# Patient Record
Sex: FEMALE | Race: WHITE | NOT HISPANIC OR LATINO | Employment: OTHER | URBAN - METROPOLITAN AREA
[De-identification: names, ages, dates, MRNs, and addresses within clinical notes are randomized per-mention and may not be internally consistent; named-entity substitution may affect disease eponyms.]

---

## 2017-02-28 ENCOUNTER — HOSPITAL ENCOUNTER (OUTPATIENT)
Dept: RADIOLOGY | Facility: MEDICAL CENTER | Age: 67
End: 2017-02-28

## 2017-04-21 ENCOUNTER — GYNECOLOGY VISIT (OUTPATIENT)
Dept: OBGYN | Facility: CLINIC | Age: 67
End: 2017-04-21
Payer: OTHER MISCELLANEOUS

## 2017-04-21 VITALS
SYSTOLIC BLOOD PRESSURE: 140 MMHG | BODY MASS INDEX: 35.31 KG/M2 | DIASTOLIC BLOOD PRESSURE: 82 MMHG | WEIGHT: 233 LBS | HEIGHT: 68 IN

## 2017-04-21 DIAGNOSIS — N81.9 VAGINAL VAULT PROLAPSE: ICD-10-CM

## 2017-04-21 DIAGNOSIS — Z12.31 VISIT FOR SCREENING MAMMOGRAM: ICD-10-CM

## 2017-04-21 PROCEDURE — 99203 OFFICE O/P NEW LOW 30 MIN: CPT | Performed by: OBSTETRICS & GYNECOLOGY

## 2017-04-21 PROCEDURE — 3014F SCREEN MAMMO DOC REV: CPT | Performed by: OBSTETRICS & GYNECOLOGY

## 2017-04-21 PROCEDURE — 4004F PT TOBACCO SCREEN RCVD TLK: CPT | Mod: 8P | Performed by: OBSTETRICS & GYNECOLOGY

## 2017-04-21 PROCEDURE — 3017F COLORECTAL CA SCREEN DOC REV: CPT | Mod: 8P | Performed by: OBSTETRICS & GYNECOLOGY

## 2017-04-21 PROCEDURE — 4040F PNEUMOC VAC/ADMIN/RCVD: CPT | Mod: 8P | Performed by: OBSTETRICS & GYNECOLOGY

## 2017-04-21 PROCEDURE — G8419 CALC BMI OUT NRM PARAM NOF/U: HCPCS | Performed by: OBSTETRICS & GYNECOLOGY

## 2017-04-21 PROCEDURE — G8432 DEP SCR NOT DOC, RNG: HCPCS | Performed by: OBSTETRICS & GYNECOLOGY

## 2017-04-21 PROCEDURE — 1101F PT FALLS ASSESS-DOCD LE1/YR: CPT | Mod: 8P | Performed by: OBSTETRICS & GYNECOLOGY

## 2017-04-21 NOTE — MR AVS SNAPSHOT
"Meghan Mcrae   2017 3:00 PM   Gynecology Visit   MRN: [id number]    Department:  [facility]   Dept Phone:  [phone]    Description:  Female : 1950   Provider:  Princess No M.D.           Reason for Visit     New Patient           Allergies as of 2017     Allergen Noted Reactions    Penicillins 09/10/2013   Itching      Vital Signs     Blood Pressure Height Weight Body Mass Index Breastfeeding? Smoking Status    140/82 mmHg 1.727 m (5' 8\") 105.688 kg (233 lb) 35.44 kg/m2 No Current Some Day Smoker      Basic Information     Date Of Birth Sex Race Ethnicity Preferred Language    1950 Female White Non- English      Problem List              ICD-10-CM Priority Class Noted - Resolved    Osteoarthritis of right shoulder region M19.011   2013 - Present      Health Maintenance        Date Due Completion Dates    IMM DTaP/Tdap/Td Vaccine (1 - Tdap) 10/21/1969 ---    PAP SMEAR 10/21/1971 ---    MAMMOGRAM 10/21/1990 ---    COLONOSCOPY 10/21/2000 ---    IMM ZOSTER VACCINE 10/21/2010 ---    BONE DENSITY 10/21/2015 ---    IMM PNEUMOCOCCAL 65+ (ADULT) LOW/MEDIUM RISK SERIES (1 of 2 - PCV13) 10/21/2015 ---            Current Immunizations     No immunizations on file.      Below and/or attached are the medications your provider expects you to take. Review all of your home medications and newly ordered medications with your provider and/or pharmacist. Follow medication instructions as directed by your provider and/or pharmacist. Please keep your medication list with you and share with your provider. Update the information when medications are discontinued, doses are changed, or new medications (including over-the-counter products) are added; and carry medication information at all times in the event of emergency situations     Allergies:  PENICILLINS - Itching               Medications  Valid as of: 2017 -  4:45 PM    Generic Name Brand Name Tablet Size " Instructions for use    Cholecalciferol (Cap) vitamin D3 5000 UNITS Take 1 Cap by mouth every day.        Gabapentin (Cap) NEURONTIN 300 MG Take 1 Cap by mouth 3 times a day.        Lisinopril (Tab) PRINIVIL 20 MG Take 20 mg by mouth every morning.        OxyCODONE HCl (TABLET SR 12 HR) OXYCONTIN 40 MG Take 60 mg by mouth 4 times a day.        OxyCODONE HCl (Tablet Extended Release 12 hour Abuse-Deterrent) OXYCONTIN 60 MG Take 60 mg by mouth 4 times a day as needed (PT sometimes breaks tablets in half to get smaller dose.).        Oxycodone-Acetaminophen (Tab) PERCOCET-10  MG Take 1-2 Tabs by mouth every four hours as needed.        Sodium Chloride (Tab) SALT 1 GM Take 1 g by mouth 3 times a day. Indications: Electrolyte Disturbance        Vitamin E (Cap) VITAMIN E 400 UNIT Take 400 Units by mouth every day.        .                 Medicines prescribed today were sent to:     [facility] [address] [place] [id number]    Phone: [phone] Fax: [phone]    Open 24 Hours?: No      Medication refill instructions:       If your prescription bottle indicates you have medication refills left, it is not necessary to call your provider’s office. Please contact your pharmacy and they will refill your medication.    If your prescription bottle indicates you do not have any refills left, you may request refills at any time through one of the following ways: The online [email or web address] system (except Urgent Care), by calling your provider’s office, or by asking your pharmacy to contact your provider’s office with a refill request. Medication refills are processed only during regular business hours and may not be available until the next business day. Your provider may request additional information or to have a follow-up visit with you prior to refilling your medication.   *Please Note: Medication refills are assigned a new Rx number when refilled  electronically. Your pharmacy may indicate that no refills were authorized even though a new prescription for the same medication is available at the pharmacy. Please request the medicine by name with the pharmacy before contacting your provider for a refill.           [employer] Access Code: [email or web address]  Expires: 5/21/2017  2:36 PM    [employer]  A secure, online tool to manage your health information     [employer]’s [employer]® is a secure, online tool that connects you to your personalized health information from the privacy of your home -- day or night - making it very easy for you to manage your healthcare. Once the activation process is completed, you can even access your medical information using the [employer] ellen, which is available for free in the Apple Ellen store or Google Play store.     [employer] provides the following levels of access (as shown below):   My Chart Features   [facility] Doctor [facility]  Specialists [facility]  Urgent  Care Non-[facility]  Primary Care  Doctor   Email your healthcare team securely and privately 24/7 X X X    Manage appointments: schedule your next appointment; view details of past/upcoming appointments X      Request prescription refills. X      View recent personal medical records, including lab and immunizations X X X X   View health record, including health history, allergies, medications X X X X   Read reports about your outpatient visits, procedures, consult and ER notes X X X X   See your discharge summary, which is a recap of your hospital and/or ER visit that includes your diagnosis, lab results, and care plan. X X       How to register for [employer]:  1. Go to  [email or web address].  2. Click on the Sign Up Now box, which takes you to the New Member Sign Up page. You will need to provide the following information:  a. Enter your [employer] Access Code exactly as it appears at the top of this page. (You will not need to use this code after you’ve completed the sign-up  process. If you do not sign up before the expiration date, you must request a new code.)   b. Enter your date of birth.   c. Enter your home email address.   d. Click Submit, and follow the next screen’s instructions.  3. Create a [employer] ID. This will be your [employer] login ID and cannot be changed, so think of one that is secure and easy to remember.  4. Create a [employer] password. You can change your password at any time.  5. Enter your Password Reset Question and Answer. This can be used at a later time if you forget your password.   6. Enter your e-mail address. This allows you to receive e-mail notifications when new information is available in [employer].  7. Click Sign Up. You can now view your health information.    For assistance activating your [employer] account, call [phone]        Quit Tobacco Information     Do you want to quit using tobacco?    Quitting tobacco decreases risks of cancer, heart and lung disease, increases life expectancy, improves sense of taste and smell, and increases spending money, among other benefits.    If you are thinking about quitting, we can help.  • [facility] Quit Tobacco Program: 101.788.1307  o Program occurs weekly for four weeks and includes pharmacist consultation on products to support quitting smoking or chewing tobacco. A provider referral is needed for pharmacist consultation.  • Tobacco Users Help Hotline: 6-362-QUIT-NOW (115-5235) or https://[place].quitlogix.org/  o Free, confidential telephone and online coaching for [place] residents. Sessions are designed on a schedule that is convenient for you. Eligible clients receive free nicotine replacement therapy.  • Nationally: www.smokefree.gov  o Information and professional assistance to support both immediate and long-term needs as you become, and remain, a non-smoker. Smokefree.gov allows you to choose the help that best fits your needs.

## 2017-04-21 NOTE — PROGRESS NOTES
"66-year-old female who is here today desiring hysterectomy. Patient states that she was given a pessary by a previous physician and she's already had her tubes removed and now desires a hysterectomy. Patient states that her tubes were removed in 2007 and also her ovaries. Patient is complaining of vaginal vault prolapse as well as leakage of urine. Patient states that she leaks urine if she coughs sneezes or strains she also leaks urine and has urge incontinence. She was given a pessary about a year and a half ago she states she uses this pessary without complication and she removes it as needed and cleans it every 2 weeks or so. Otherwise she is without complaints she is not experiencing any significant pelvic pain    Past medical history is reviewed and updated  Past surgical history is reviewed and updated  Medications reviewed and updated  Allergies reviewed and updated  Social history reviewed and updated  Family history reviewed and updated    /82 mmHg  Ht 1.727 m (5' 8\")  Wt 105.688 kg (233 lb)  BMI 35.44 kg/m2  Breastfeeding? 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 I would recommend that the patient complete those procedures first and then return here following to have further discussion of surgical intervention for her vault prolapse. In the meantime she can continue to use her pessary which is working well for her  "

## 2017-04-27 ENCOUNTER — APPOINTMENT (OUTPATIENT)
Dept: ADMISSIONS | Facility: MEDICAL CENTER | Age: 67
End: 2017-04-27
Attending: SURGERY
Payer: OTHER MISCELLANEOUS

## 2017-04-27 RX ORDER — MULTIVIT-MIN/IRON/FOLIC ACID/K 18-600-40
1 CAPSULE ORAL DAILY
COMMUNITY

## 2017-04-27 RX ORDER — OXYCODONE HCL 40 MG/1
40 TABLET, FILM COATED, EXTENDED RELEASE ORAL 3 TIMES DAILY
COMMUNITY

## 2017-04-27 RX ORDER — LISINOPRIL AND HYDROCHLOROTHIAZIDE 20; 12.5 MG/1; MG/1
1 TABLET ORAL DAILY
Status: ON HOLD | COMMUNITY
End: 2018-02-01

## 2017-05-02 ENCOUNTER — HOSPITAL ENCOUNTER (OUTPATIENT)
Facility: MEDICAL CENTER | Age: 67
End: 2017-05-02
Attending: SURGERY | Admitting: SURGERY
Payer: OTHER MISCELLANEOUS

## 2017-05-02 VITALS
HEART RATE: 70 BPM | WEIGHT: 231.26 LBS | DIASTOLIC BLOOD PRESSURE: 62 MMHG | SYSTOLIC BLOOD PRESSURE: 123 MMHG | HEIGHT: 68 IN | RESPIRATION RATE: 17 BRPM | OXYGEN SATURATION: 94 % | TEMPERATURE: 97 F | BODY MASS INDEX: 35.05 KG/M2

## 2017-05-02 PROBLEM — K40.90 LEFT INGUINAL HERNIA: Status: ACTIVE | Noted: 2017-05-02

## 2017-05-02 LAB
ANION GAP SERPL CALC-SCNC: 9 MMOL/L (ref 0–11.9)
BUN SERPL-MCNC: 19 MG/DL (ref 8–22)
CALCIUM SERPL-MCNC: 9.2 MG/DL (ref 8.5–10.5)
CHLORIDE SERPL-SCNC: 98 MMOL/L (ref 96–112)
CO2 SERPL-SCNC: 25 MMOL/L (ref 20–33)
CREAT SERPL-MCNC: 1.02 MG/DL (ref 0.5–1.4)
EKG IMPRESSION: NORMAL
GFR SERPL CREATININE-BSD FRML MDRD: 54 ML/MIN/1.73 M 2
GLUCOSE SERPL-MCNC: 113 MG/DL (ref 65–99)
POTASSIUM SERPL-SCNC: 4.3 MMOL/L (ref 3.6–5.5)
SODIUM SERPL-SCNC: 132 MMOL/L (ref 135–145)

## 2017-05-02 PROCEDURE — 160009 HCHG ANES TIME/MIN: Performed by: SURGERY

## 2017-05-02 PROCEDURE — 93010 ELECTROCARDIOGRAM REPORT: CPT | Performed by: INTERNAL MEDICINE

## 2017-05-02 PROCEDURE — C1781 MESH (IMPLANTABLE): HCPCS | Performed by: SURGERY

## 2017-05-02 PROCEDURE — 700101 HCHG RX REV CODE 250

## 2017-05-02 PROCEDURE — 160028 HCHG SURGERY MINUTES - 1ST 30 MINS LEVEL 3: Performed by: SURGERY

## 2017-05-02 PROCEDURE — 160048 HCHG OR STATISTICAL LEVEL 1-5: Performed by: SURGERY

## 2017-05-02 PROCEDURE — A6402 STERILE GAUZE <= 16 SQ IN: HCPCS | Performed by: SURGERY

## 2017-05-02 PROCEDURE — 501838 HCHG SUTURE GENERAL: Performed by: SURGERY

## 2017-05-02 PROCEDURE — 93005 ELECTROCARDIOGRAM TRACING: CPT | Performed by: SURGERY

## 2017-05-02 PROCEDURE — 700111 HCHG RX REV CODE 636 W/ 250 OVERRIDE (IP)

## 2017-05-02 PROCEDURE — 80048 BASIC METABOLIC PNL TOTAL CA: CPT

## 2017-05-02 PROCEDURE — 160036 HCHG PACU - EA ADDL 30 MINS PHASE I: Performed by: SURGERY

## 2017-05-02 PROCEDURE — A9270 NON-COVERED ITEM OR SERVICE: HCPCS

## 2017-05-02 PROCEDURE — 500122 HCHG BOVIE, BLADE: Performed by: SURGERY

## 2017-05-02 PROCEDURE — 700102 HCHG RX REV CODE 250 W/ 637 OVERRIDE(OP)

## 2017-05-02 PROCEDURE — 160002 HCHG RECOVERY MINUTES (STAT): Performed by: SURGERY

## 2017-05-02 PROCEDURE — 502240 HCHG MISC OR SUPPLY RC 0272: Performed by: SURGERY

## 2017-05-02 PROCEDURE — 500380 HCHG DRAIN, PENROSE 1/4X12: Performed by: SURGERY

## 2017-05-02 PROCEDURE — 160039 HCHG SURGERY MINUTES - EA ADDL 1 MIN LEVEL 3: Performed by: SURGERY

## 2017-05-02 PROCEDURE — 160035 HCHG PACU - 1ST 60 MINS PHASE I: Performed by: SURGERY

## 2017-05-02 DEVICE — MESH SOFT 4 X 6 (3/CA): Type: IMPLANTABLE DEVICE | Status: FUNCTIONAL

## 2017-05-02 RX ORDER — LIDOCAINE AND PRILOCAINE 25; 25 MG/G; MG/G
1 CREAM TOPICAL
Status: DISCONTINUED | OUTPATIENT
Start: 2017-05-02 | End: 2017-05-02 | Stop reason: HOSPADM

## 2017-05-02 RX ORDER — SODIUM CHLORIDE, SODIUM LACTATE, POTASSIUM CHLORIDE, CALCIUM CHLORIDE 600; 310; 30; 20 MG/100ML; MG/100ML; MG/100ML; MG/100ML
INJECTION, SOLUTION INTRAVENOUS CONTINUOUS
Status: DISCONTINUED | OUTPATIENT
Start: 2017-05-02 | End: 2017-05-02 | Stop reason: HOSPADM

## 2017-05-02 RX ORDER — LIDOCAINE HYDROCHLORIDE 10 MG/ML
0.5 INJECTION, SOLUTION INFILTRATION; PERINEURAL
Status: DISCONTINUED | OUTPATIENT
Start: 2017-05-02 | End: 2017-05-02 | Stop reason: HOSPADM

## 2017-05-02 RX ORDER — BUPIVACAINE HYDROCHLORIDE AND EPINEPHRINE 5; 5 MG/ML; UG/ML
INJECTION, SOLUTION EPIDURAL; INTRACAUDAL; PERINEURAL
Status: DISCONTINUED | OUTPATIENT
Start: 2017-05-02 | End: 2017-05-02 | Stop reason: HOSPADM

## 2017-05-02 RX ORDER — OXYCODONE HCL 5 MG/5 ML
SOLUTION, ORAL ORAL
Status: COMPLETED
Start: 2017-05-02 | End: 2017-05-02

## 2017-05-02 RX ADMIN — OXYCODONE HYDROCHLORIDE 10 MG: 5 SOLUTION ORAL at 12:20

## 2017-05-02 RX ADMIN — SODIUM CHLORIDE, SODIUM LACTATE, POTASSIUM CHLORIDE, CALCIUM CHLORIDE: 600; 310; 30; 20 INJECTION, SOLUTION INTRAVENOUS at 09:40

## 2017-05-02 RX ADMIN — ALBUTEROL SULFATE 2.5 MG: 2.5 SOLUTION RESPIRATORY (INHALATION) at 12:10

## 2017-05-02 ASSESSMENT — PAIN SCALES - GENERAL
PAINLEVEL_OUTOF10: 0

## 2017-05-02 NOTE — OP REPORT
DATE OF SERVICE:  05/02/2017    DATE OF OPERATION:  05/02/2017    PREOPERATIVE DIAGNOSIS:  Left inguinal hernia (reducible, non-recurrent).    POSTOPERATIVE DIAGNOSIS:  Left inguinal hernia (reducible, non-recurrent).    PROCEDURE PERFORMED:  Repair of left inguinal hernia with mesh.    SURGEON:  Ilene Goff MD    ANESTHESIOLOGIST:  Chris Sharma MD    ANESTHESIA TYPE:  General.    PREOPERATIVE MEDICATIONS:  Ancef.    ASA:  Class II.    INDICATIONS:  This is a 66-year-old woman who presents with an enlarging left   inguinal hernia.  We have discussed risks and benefits of surgery and she   feels ready to proceed.    FINDINGS:  Large direct hernia.    PROCEDURE SUMMARY:  The patient was anesthetized in supine position, then   prepped with Betadine and draped in sterile fashion.  Time-out was confirmed.    Local anesthetic was injected prior to all incisions.  The incision was   created in what was felt to be 1 cm below the internal ring, but her pannus   did distort her anatomy somewhat.  The superficial vessels were ligated with   Vicryl ties and the external oblique aponeurosis was identified actually more   superior than the initial incision.  This was therefore cleared of the   overlying subcutaneous tissue and then a slit was created following the   direction of the fibers to the external ring.  The flaps were created in the   avascular plane.  The hernia defect was easily identified and reduced with   division of the loose areolar attachments.  The round ligament remnant was   ligated with 2-0 Vicryl ties.  The transversalis fascia was then imbricated on   itself using Vicryl sutures to reduce the hernia sac and keep it out of the   surgical field.  I then took the Bard soft mesh, which measured 4x6 inches and   this was minimally trimmed given the patient's body habitus.  This was   secured with 2-0 Prolene sutures, 2 cm medial to the pubic tubercle in an   interrupted fashion medially and superiorly  in the fascia.  A 2-0 Prolene   suture was also used to secure this 1 cm medial to the pubic tubercle and then   in continuous fashion to the iliopubic tract.  We had excellent visualization   of her anatomy at this point.  Laterally, the mesh was also secured with   Prolene sutures.  We irrigated and ensured there was excellent hemostasis.    The external oblique aponeurosis was then closed over the mesh using a running   2-0 Vicryl suture.  Further anesthetic was injected.  3-0 Vicryls were then   used to close Lizzy's fascia and then the deep dermal layer and then 4-0   Monocryl was used to close the skin.  Sterile dressings were applied and I was   informed all counts are correct.       ____________________________________     MD ADÁN Silverio / JODY    DD:  05/02/2017 11:52:02  DT:  05/02/2017 15:12:42    D#:  [id number]  Job#:  [id number]    cc: ARVIND Ibrahim MD

## 2017-05-02 NOTE — IP AVS SNAPSHOT
5/2/2017    Meghan Mcrae  [address]  [facility] [address]    Dear Meghan:    [facility] wants to ensure your discharge home is safe and you or your loved ones have had all of your questions answered regarding your care after you leave the hospital.    Below is a list of resources and contact information should you have any questions regarding your hospital stay, follow-up instructions, or active medical symptoms.    Questions or Concerns Regarding… Contact   Medical Questions Related to Your Discharge  (7 days a week, 8am-5pm) Contact a Nurse Care Coordinator   [phone]   Medical Questions Not Related to Your Discharge  (24 hours a day / 7 days a week)  Contact the Nurse Health Line   [phone]    Medications or Discharge Instructions Refer to your discharge packet   or contact your [facility] Primary Care Provider   [phone]   Follow-up Appointment(s) Schedule your appointment via SeeSpace   or contact Scheduling [phone]   Billing Review your statement via SeeSpace  or contact Billing [phone]   Medical Records Review your records via SeeSpace   or contact Medical Records [phone]     You may receive a telephone call within two days of discharge. This call is to make certain you understand your discharge instructions and have the opportunity to have any questions answered. You can also easily access your medical information, test results and upcoming appointments via the SeeSpace free online health management tool. You can learn more and sign up at [employer]/SeeSpace. For assistance setting up your SeeSpace account, please call [phone].    Once again, we want to ensure your discharge home is safe and that you have a clear understanding of any next steps in your care. If you have any questions or concerns, please do not hesitate to contact us, we are here for you. Thank you for choosing [facility] for your healthcare needs.    Sincerely,    Your [facility] Healthcare Team

## 2017-05-02 NOTE — OR NURSING
1153 Received from OR, report received from Dr. Sharma.  Left femoral dressing CDI.      1208 Pt is wheezing Dr. Sharma updated and ordered a breathing treatment.      1210 Breathing treatment started per MD orders.     1220 Oral pain medication given see mar    1230 Pt  brought to bedside.      1300 Pt attempted to void.  IS teaching completed.      1315 Pt voided without difficulty.  No bleeding noted on dressing.      1320 Pt ambulated around until with no difficulty.  No sob noted and oxygen saturation was 96% on room air.      1345 Dc instructions completed with Pt and her .      1355 Pt dc ' d to car via ambulating, steady gait. No c/o of pain or n/v.  Pt has all belongings.

## 2017-05-02 NOTE — OR SURGEON
Immediate Post-Operative Note      PreOp Diagnosis: Left inguinal hernia    PostOp Diagnosis: same    Procedure(s):  INGUINAL HERNIA REPAIR-OPEN - Wound Class: Clean    Surgeon(s):  Ilene Goff M.D.    Anesthesiologist/Type of Anesthesia:  Anesthesiologist: Chris Sharma M.D./General    Surgical Staff:  Circulator: Lisandra Siddiqui R.N.  Relief Circulator: Alyssa Wallis R.N.  Relief Scrub: Myranda Hussein  Scrub Person: Carmel Green    Specimen: None    Estimated Blood Loss: Min    Findings: direct hernia    Complications: None    ASA 2  Dictated [id number]    5/2/2017 11:47 AM Ilene Goff

## 2017-05-02 NOTE — IP AVS SNAPSHOT
" Home Care Instructions                                                                                                                Name:Meghan Mcrae  Medical Record Number:[id number]  CSN: [id number]    YOB: 1950   Age: 66 y.o.  Sex: female  HT:1.727 m (5' 8\") WT: 104.9 kg (231 lb 4.2 oz)          Admit Date: 5/2/2017     Discharge Date:   Today's Date: 5/2/2017  Attending Doctor:  Ilene Goff M.D.                  Allergies:  Penicillins                Discharge Instructions         ACTIVITY: Rest and take it easy for the first 24 hours.  A responsible adult is recommended to remain with you during that time.  It is normal to feel sleepy.  We encourage you to not do anything that requires balance, judgment or coordination.    MILD FLU-LIKE SYMPTOMS ARE NORMAL. YOU MAY EXPERIENCE GENERALIZED MUSCLE ACHES, THROAT IRRITATION, HEADACHE AND/OR SOME NAUSEA.    FOR 24 HOURS DO NOT:  Drive, operate machinery or run household appliances.  Drink beer or alcoholic beverages.   Make important decisions or sign legal documents.    SPECIAL INSTRUCTIONS:  Ambulate regularly, no lifting over 5 pounds.       DIET: To avoid nausea, slowly advance diet as tolerated, avoiding spicy or greasy foods for the first day.  Add more substantial food to your diet according to your physician's instructions.  Babies can be fed formula or breast milk as soon as they are hungry.  INCREASE FLUIDS AND FIBER TO AVOID CONSTIPATION.    SURGICAL DRESSING/BATHING: May shower but no baths. May remove tegaderm and gauze, but leave steri strips.    FOLLOW-UP APPOINTMENT:  A follow-up appointment should be arranged with your doctor, call to schedule.    You should CALL YOUR PHYSICIAN if you develop:  Fever greater than 101 degrees F.  Pain not relieved by medication, or persistent nausea or vomiting.  Excessive bleeding (blood soaking through dressing) or unexpected drainage from the wound.  Extreme redness or swelling around the " incision site, drainage of pus or foul smelling drainage.  Inability to urinate or empty your bladder within 8 hours.  Problems with breathing or chest pain.    You should call 911 if you develop problems with breathing or chest pain.  If you are unable to contact your doctor or surgical center, you should go to the nearest emergency room or urgent care center.  Physician's telephone #: *Dr. Goff [phone]    If any questions arise, call your doctor.  If your doctor is not available, please feel free to call the Surgical Center at [phone].  The Center is open Monday through Friday from 7AM to 7PM.  You can also call the HEALTH HOTLINE open 24 hours/day, 7 days/week and speak to a nurse at [phone], or toll free at [phone].    A registered nurse may call you a few days after your surgery to see how you are doing after your procedure.    MEDICATIONS: Resume taking daily medication.  Take prescribed pain medication with food.  If no medication is prescribed, you may take non-aspirin pain medication if needed.  PAIN MEDICATION CAN BE VERY CONSTIPATING.  Take a stool softener or laxative such as senokot, pericolace, or milk of magnesia if needed.    Prescription given for Zofran .  Last pain medication given at 12:20, next dose due at 4:20 Pm.    If your physician has prescribed pain medication that includes Acetaminophen (Tylenol), do not take additional Acetaminophen (Tylenol) while taking the prescribed medication.    Depression / Suicide Risk    As you are discharged from this [facility] facility, it is important to learn how to keep safe from harming yourself.    Recognize the warning signs:  · Abrupt changes in personality, positive or negative- including increase in energy   · Giving away possessions  · Change in eating patterns- significant weight changes-  positive or negative  · Change in sleeping patterns- unable to sleep or sleeping all the time   · Unwillingness or inability to  communicate  · Depression  · Unusual sadness, discouragement and loneliness  · Talk of wanting to die  · Neglect of personal appearance   · Rebelliousness- reckless behavior  · Withdrawal from people/activities they love  · Confusion- inability to concentrate     If you or a loved one observes any of these behaviors or has concerns about self-harm, here's what you can do:  · Talk about it- your feelings and reasons for harming yourself  · Remove any means that you might use to hurt yourself (examples: pills, rope, extension cords, firearm)  · Get professional help from the community (Mental Health, Substance Abuse, psychological counseling)  · Do not be alone:Call your Safe Contact- someone whom you trust who will be there for you.  · Call your local CRISIS HOTLINE 296-4175 or 674-795-8146  · Call your local Children's Mobile Crisis Response Team [place] (139) 886-5454 or www.mPort  · Call the toll free National Suicide Prevention Hotlines   · National Suicide Prevention Lifeline 500-090-WBZZ (0128)  · National Hope Line Network 800-SUICIDE (344-4423)       Medication List      CONTINUE taking these medications        Instructions    Morning Afternoon Evening Bedtime    gabapentin 300 MG Caps   Commonly known as:  NEURONTIN        Take 1 Cap by mouth 3 times a day.   Dose:  300 mg                        lisinopril-hydrochlorothiazide 20-12.5 MG per tablet   Commonly known as:  PRINZIDE, ZESTORETIC        Take 1 Tab by mouth every day.   Dose:  1 Tab                        MULTIVITAMIN ADULT PO        Take  by mouth.                        oxyCODONE CR 40 MG T12a tablet   Commonly known as:  OXYCONTIN        Take 40 mg by mouth every 12 hours.   Dose:  40 mg                        oxycodone-acetaminophen  MG Tabs   Commonly known as:  PERCOCET-10        Take 1-2 Tabs by mouth every four hours as needed.   Dose:  1-2 Tab                        VITAMIN C PO        Take  by mouth.                         Vitamin D 2000 UNITS Caps        Take  by mouth every day.                          STOP taking these medications     vitamin e 400 UNIT Caps   Commonly known as:  VITAMIN E                       Medication Information     Above and/or attached are the medications your physician expects you to take upon discharge. Review all of your home medications and newly ordered medications with your doctor and/or pharmacist. Follow medication instructions as directed by your doctor and/or pharmacist. Please keep your medication list with you and share with your physician. Update the information when medications are discontinued, doses are changed, or new medications (including over-the-counter products) are added; and carry medication information at all times in the event of emergency situations.        Resources     Quit Smoking / Tobacco Use:    I understand the use of any tobacco products increases my chance of suffering from future heart disease or stroke and could cause other illnesses which may shorten my life. Quitting the use of tobacco products is the single most important thing I can do to improve my health. For further information on smoking / tobacco cessation call a Toll Free Quit Line at 1-167.346.4955 (*[employer] [place]) or 1-102.227.1483 (American Lung Association) or you can access the web based program at www.lungusa.org.    [place] Tobacco Users Help Line:  (400) 577-9433       Toll Free: 1-912.732.6783  Quit Tobacco Program [facility] [phone]    Crisis Hotline:    [place] Crisis Hotline:  5-970-UZXMOCY or 1-503.445.7428    [place] Crisis Hotline:    1-350.577.8711 or 728-082-1967    Discharge Survey:   Thank you for choosing [facility]. We hope we did everything we could to make your hospital stay a pleasant one. You may be receiving a survey and we would appreciate your time and participation in answering the questions. Your input is very valuable to us in our  efforts to improve our service to our patients and their families.            Signatures     My signature on this form indicates that:    1. I acknowledge receipt and understanding of these Home Care Instruction.  2. My questions regarding this information have been answered to my satisfaction.  3. I have formulated a plan with my discharge nurse to obtain my prescribed medications for home.    __________________________________      __________________________________                   Patient Signature                                 Guardian/Responsible Adult Signature      __________________________________                 __________       ________                       Nurse Signature                                               Date                 Time

## 2017-05-02 NOTE — DISCHARGE INSTRUCTIONS
ACTIVITY: Rest and take it easy for the first 24 hours.  A responsible adult is recommended to remain with you during that time.  It is normal to feel sleepy.  We encourage you to not do anything that requires balance, judgment or coordination.    MILD FLU-LIKE SYMPTOMS ARE NORMAL. YOU MAY EXPERIENCE GENERALIZED MUSCLE ACHES, THROAT IRRITATION, HEADACHE AND/OR SOME NAUSEA.    FOR 24 HOURS DO NOT:  Drive, operate machinery or run household appliances.  Drink beer or alcoholic beverages.   Make important decisions or sign legal documents.    SPECIAL INSTRUCTIONS:  Ambulate regularly, no lifting over 5 pounds.       DIET: To avoid nausea, slowly advance diet as tolerated, avoiding spicy or greasy foods for the first day.  Add more substantial food to your diet according to your physician's instructions.  Babies can be fed formula or breast milk as soon as they are hungry.  INCREASE FLUIDS AND FIBER TO AVOID CONSTIPATION.    SURGICAL DRESSING/BATHING: May shower but no baths. May remove tegaderm and gauze, but leave steri strips.    FOLLOW-UP APPOINTMENT:  A follow-up appointment should be arranged with your doctor, call to schedule.    You should CALL YOUR PHYSICIAN if you develop:  Fever greater than 101 degrees F.  Pain not relieved by medication, or persistent nausea or vomiting.  Excessive bleeding (blood soaking through dressing) or unexpected drainage from the wound.  Extreme redness or swelling around the incision site, drainage of pus or foul smelling drainage.  Inability to urinate or empty your bladder within 8 hours.  Problems with breathing or chest pain.    You should call 911 if you develop problems with breathing or chest pain.  If you are unable to contact your doctor or surgical center, you should go to the nearest emergency room or urgent care center.  Physician's telephone #: *Dr. Goff [phone]    If any questions arise, call your doctor.  If your doctor is not available, please feel free to call  the Surgical Center at [phone].  The Center is open Monday through Friday from 7AM to 7PM.  You can also call the HEALTH HOTLINE open 24 hours/day, 7 days/week and speak to a nurse at [phone], or toll free at [phone].    A registered nurse may call you a few days after your surgery to see how you are doing after your procedure.    MEDICATIONS: Resume taking daily medication.  Take prescribed pain medication with food.  If no medication is prescribed, you may take non-aspirin pain medication if needed.  PAIN MEDICATION CAN BE VERY CONSTIPATING.  Take a stool softener or laxative such as senokot, pericolace, or milk of magnesia if needed.    Prescription given for Zofran .  Last pain medication given at 12:20, next dose due at 4:20 Pm.    If your physician has prescribed pain medication that includes Acetaminophen (Tylenol), do not take additional Acetaminophen (Tylenol) while taking the prescribed medication.    Depression / Suicide Risk    As you are discharged from this [facility] facility, it is important to learn how to keep safe from harming yourself.    Recognize the warning signs:  · Abrupt changes in personality, positive or negative- including increase in energy   · Giving away possessions  · Change in eating patterns- significant weight changes-  positive or negative  · Change in sleeping patterns- unable to sleep or sleeping all the time   · Unwillingness or inability to communicate  · Depression  · Unusual sadness, discouragement and loneliness  · Talk of wanting to die  · Neglect of personal appearance   · Rebelliousness- reckless behavior  · Withdrawal from people/activities they love  · Confusion- inability to concentrate     If you or a loved one observes any of these behaviors or has concerns about self-harm, here's what you can do:  · Talk about it- your feelings and reasons for harming yourself  · Remove any means that you might use to hurt yourself (examples: pills, rope,  extension cords, firearm)  · Get professional help from the community (Mental Health, Substance Abuse, psychological counseling)  · Do not be alone:Call your Safe Contact- someone whom you trust who will be there for you.  · Call your local CRISIS HOTLINE 095-7855 or 365-006-7736  · Call your local Children's Mobile Crisis Response Team [place] (453) 256-2677 or www.[employer]  · Call the toll free National Suicide Prevention Hotlines   · National Suicide Prevention Lifeline 437-858-LZLE (8294)  · National Hope Line Network 800-SUICIDE (624-2853)

## 2017-07-19 ENCOUNTER — HOSPITAL ENCOUNTER (INPATIENT)
Facility: MEDICAL CENTER | Age: 67
LOS: 2 days | DRG: 920 | End: 2017-07-21
Attending: SURGERY | Admitting: SURGERY
Payer: COMMERCIAL

## 2017-07-19 PROCEDURE — 770001 HCHG ROOM/CARE - MED/SURG/GYN PRIV*

## 2017-07-19 PROCEDURE — 700111 HCHG RX REV CODE 636 W/ 250 OVERRIDE (IP): Performed by: SURGERY

## 2017-07-19 PROCEDURE — A9270 NON-COVERED ITEM OR SERVICE: HCPCS | Performed by: SURGERY

## 2017-07-19 PROCEDURE — 700101 HCHG RX REV CODE 250: Performed by: SURGERY

## 2017-07-19 PROCEDURE — 700101 HCHG RX REV CODE 250

## 2017-07-19 PROCEDURE — 700102 HCHG RX REV CODE 250 W/ 637 OVERRIDE(OP): Performed by: SURGERY

## 2017-07-19 RX ORDER — DIPHENHYDRAMINE HCL 25 MG
25-50 TABLET ORAL EVERY 6 HOURS PRN
Status: DISCONTINUED | OUTPATIENT
Start: 2017-07-19 | End: 2017-07-21 | Stop reason: HOSPADM

## 2017-07-19 RX ORDER — CLINDAMYCIN PHOSPHATE 150 MG/ML
INJECTION, SOLUTION INTRAVENOUS
Status: COMPLETED
Start: 2017-07-19 | End: 2017-07-19

## 2017-07-19 RX ORDER — DIPHENHYDRAMINE HCL 25 MG
25-50 TABLET ORAL NIGHTLY PRN
Status: DISCONTINUED | OUTPATIENT
Start: 2017-07-19 | End: 2017-07-21 | Stop reason: HOSPADM

## 2017-07-19 RX ORDER — DEXTROSE MONOHYDRATE, SODIUM CHLORIDE, AND POTASSIUM CHLORIDE 50; 1.49; 4.5 G/1000ML; G/1000ML; G/1000ML
INJECTION, SOLUTION INTRAVENOUS CONTINUOUS
Status: DISCONTINUED | OUTPATIENT
Start: 2017-07-20 | End: 2017-07-20

## 2017-07-19 RX ORDER — ASPIRIN 81 MG/1
81 TABLET, CHEWABLE ORAL DAILY
Status: ON HOLD | COMMUNITY
End: 2017-07-21

## 2017-07-19 RX ORDER — MORPHINE SULFATE 4 MG/ML
2-4 INJECTION, SOLUTION INTRAMUSCULAR; INTRAVENOUS EVERY 4 HOURS PRN
Status: DISCONTINUED | OUTPATIENT
Start: 2017-07-19 | End: 2017-07-21 | Stop reason: HOSPADM

## 2017-07-19 RX ORDER — ONDANSETRON 2 MG/ML
4 INJECTION INTRAMUSCULAR; INTRAVENOUS EVERY 6 HOURS PRN
Status: DISCONTINUED | OUTPATIENT
Start: 2017-07-19 | End: 2017-07-21 | Stop reason: HOSPADM

## 2017-07-19 RX ORDER — CLINDAMYCIN PHOSPHATE 150 MG/ML
900 INJECTION, SOLUTION INTRAVENOUS EVERY 8 HOURS
Status: DISCONTINUED | OUTPATIENT
Start: 2017-07-19 | End: 2017-07-19

## 2017-07-19 RX ORDER — OXYCODONE HCL 20 MG/1
40 TABLET, FILM COATED, EXTENDED RELEASE ORAL 3 TIMES DAILY
Status: DISCONTINUED | OUTPATIENT
Start: 2017-07-19 | End: 2017-07-21 | Stop reason: HOSPADM

## 2017-07-19 RX ADMIN — OXYCODONE HYDROCHLORIDE 40 MG: 20 TABLET, FILM COATED, EXTENDED RELEASE ORAL at 21:24

## 2017-07-19 RX ADMIN — ENOXAPARIN SODIUM 30 MG: 100 INJECTION SUBCUTANEOUS at 21:25

## 2017-07-19 RX ADMIN — POTASSIUM CHLORIDE, DEXTROSE MONOHYDRATE AND SODIUM CHLORIDE: 150; 5; 450 INJECTION, SOLUTION INTRAVENOUS at 22:26

## 2017-07-19 RX ADMIN — CLINDAMYCIN PHOSPHATE 900 MG: 150 INJECTION, SOLUTION INTRAVENOUS at 22:25

## 2017-07-19 ASSESSMENT — LIFESTYLE VARIABLES
EVER_SMOKED: YES
ALCOHOL_USE: NO

## 2017-07-19 ASSESSMENT — PATIENT HEALTH QUESTIONNAIRE - PHQ9
SUM OF ALL RESPONSES TO PHQ QUESTIONS 1-9: 0
1. LITTLE INTEREST OR PLEASURE IN DOING THINGS: NOT AT ALL
SUM OF ALL RESPONSES TO PHQ9 QUESTIONS 1 AND 2: 0
2. FEELING DOWN, DEPRESSED, IRRITABLE, OR HOPELESS: NOT AT ALL

## 2017-07-19 ASSESSMENT — PAIN SCALES - GENERAL: PAINLEVEL_OUTOF10: 5

## 2017-07-19 NOTE — PROGRESS NOTES
Direct admit from MD office. Accepted by Dr. Goff for lt groin cellulitis.  ADT signed & held, needs to be released upon pt arrival.  Written orders received, faxed to unit and scanned to media tab.  Pt coming by car.

## 2017-07-19 NOTE — IP AVS SNAPSHOT
Cloud Amenity Access Code: [email or web address]  Expires: 8/20/2017  1:34 PM    Cloud Amenity  A secure, online tool to manage your health information     [employer]’s Cloud Amenity® is a secure, online tool that connects you to your personalized health information from the privacy of your home -- day or night - making it very easy for you to manage your healthcare. Once the activation process is completed, you can even access your medical information using the Cloud Amenity ellen, which is available for free in the Apple Ellen store or Google Play store.     Cloud Amenity provides the following levels of access (as shown below):   My Chart Features   [facility] Primary Care Doctor [facility]  Specialists [facility]  Urgent  Care Non-[facility]  Primary Care  Doctor   Email your healthcare team securely and privately 24/7 X X X X   Manage appointments: schedule your next appointment; view details of past/upcoming appointments X      Request prescription refills. X      View recent personal medical records, including lab and immunizations X X X X   View health record, including health history, allergies, medications X X X X   Read reports about your outpatient visits, procedures, consult and ER notes X X X X   See your discharge summary, which is a recap of your hospital and/or ER visit that includes your diagnosis, lab results, and care plan. X X       How to register for Cloud Amenity:  1. Go to  [email or web address].  2. Click on the Sign Up Now box, which takes you to the New Member Sign Up page. You will need to provide the following information:  a. Enter your Cloud Amenity Access Code exactly as it appears at the top of this page. (You will not need to use this code after you’ve completed the sign-up process. If you do not sign up before the expiration date, you must request a new code.)   b. Enter your date of birth.   c. Enter your home email address.   d. Click Submit, and follow the next screen’s instructions.  3. Create a Cloud Amenity ID. This will be your Cloud Amenity  login ID and cannot be changed, so think of one that is secure and easy to remember.  4. Create a [employer] password. You can change your password at any time.  5. Enter your Password Reset Question and Answer. This can be used at a later time if you forget your password.   6. Enter your e-mail address. This allows you to receive e-mail notifications when new information is available in [employer].  7. Click Sign Up. You can now view your health information.    For assistance activating your [employer] account, call [phone]

## 2017-07-19 NOTE — IP AVS SNAPSHOT
7/21/2017    Meghan Mcrae  [address]  [place] [address]    Dear Meghan:    [facility] wants to ensure your discharge home is safe and you or your loved ones have had all of your questions answered regarding your care after you leave the hospital.    Below is a list of resources and contact information should you have any questions regarding your hospital stay, follow-up instructions, or active medical symptoms.    Questions or Concerns Regarding… Contact   Medical Questions Related to Your Discharge  (7 days a week, 8am-5pm) Contact a Nurse Care Coordinator   [phone]   Medical Questions Not Related to Your Discharge  (24 hours a day / 7 days a week)  Contact the Nurse Health Line   [phone]    Medications or Discharge Instructions Refer to your discharge packet   or contact your [facility] Primary Care Provider   [phone]   Follow-up Appointment(s) Schedule your appointment via NXT-ID   or contact Scheduling [phone]   Billing Review your statement via NXT-ID  or contact Billing [phone]   Medical Records Review your records via NXT-ID   or contact Medical Records [phone]     You may receive a telephone call within two days of discharge. This call is to make certain you understand your discharge instructions and have the opportunity to have any questions answered. You can also easily access your medical information, test results and upcoming appointments via the NXT-ID free online health management tool. You can learn more and sign up at [email or web address]. For assistance setting up your NXT-ID account, please call [phone].    Once again, we want to ensure your discharge home is safe and that you have a clear understanding of any next steps in your care. If you have any questions or concerns, please do not hesitate to contact us, we are here for you. Thank you for choosing [facility] for your healthcare needs.    Sincerely,    Your [facility] Healthcare Team

## 2017-07-19 NOTE — IP AVS SNAPSHOT
" Home Care Instructions                                                                                                                  Name:Meghan Mcrae  Medical Record Number:[id number]  CSN: [id number]    YOB: 1950   Age: 66 y.o.  Sex: female  HT:1.753 m (5' 9\") WT: 105 kg (231 lb 7.7 oz)          Admit Date: 7/19/2017     Discharge Date:   Today's Date: 7/21/2017  Attending Doctor:  Ilene Goff M.D.                  Allergies:  Penicillins            Discharge Instructions       Walk regularly.  Record JOBY drainage daily and bring written record to PO visit.  Call Dr. Goff's office today to make appt for Monday if possible.  Continue Clindamycin.  Call if recurrent erythema, fevers/chills, excessive bleeding, dizziness/lightheadedness, or other questions or concerns.  May shower with drain site covered in waterproof dressing.      Discharge Instructions    Discharged to home by car with relative. Discharged via wheelchair, hospital escort: Yes.  Special equipment needed: JOBY drain    Be sure to schedule a follow-up appointment with your primary care doctor or any specialists as instructed.     Discharge Plan:   Diet Plan: Discussed  Activity Level: Discussed  Smoking Cessation Offered: Patient Refused  Confirmed Follow up Appointment: Patient to Call and Schedule Appointment  Confirmed Symptoms Management: Discussed  Medication Reconciliation Updated: Yes  Influenza Vaccine Indication: Patient Refuses    I understand that a diet low in cholesterol, fat, and sodium is recommended for good health. Unless I have been given specific instructions below for another diet, I accept this instruction as my diet prescription.   Other diet: as tolerated    Special Instructions: None    · Is patient discharged on Warfarin / Coumadin?   No     · Is patient Post Blood Transfusion?  No    Incision and Drainage, Care After  Refer to this sheet in the next few weeks. These instructions provide you with " information on caring for yourself after your procedure. Your caregiver may also give you more specific instructions. Your treatment has been planned according to current medical practices, but problems sometimes occur. Call your caregiver if you have any problems or questions after your procedure.  HOME CARE INSTRUCTIONS   · If antibiotic medicine is given, take it as directed. Finish it even if you start to feel better.  · Only take over-the-counter or prescription medicines for pain, discomfort, or fever as directed by your caregiver.  · Keep all follow-up appointments as directed by your caregiver.  · Change any bandages (dressings) as directed by your caregiver. Replace old dressings with clean dressings.  · Wash your hands before and after caring for your wound.  You will receive specific instructions for cleansing and caring for your wound.   SEEK MEDICAL CARE IF:   · You have increased pain, swelling, or redness around the wound.  · You have increased drainage, smell, or bleeding from the wound.  · You have muscle aches, chills, or you feel generally sick.  · You have a fever.  MAKE SURE YOU:   · Understand these instructions.  · Will watch your condition.  · Will get help right away if you are not doing well or get worse.     This information is not intended to replace advice given to you by your health care provider. Make sure you discuss any questions you have with your health care provider.     Document Released: 03/11/2013 Document Revised: 01/08/2016 Document Reviewed: 03/11/2013  [employer] Interactive Patient Education ©2016 [employer]      Bulb Drain Home Care  A bulb drain consists of a thin rubber tube and a soft, round bulb that creates a gentle suction. The rubber tube is placed in the area where you had surgery. A bulb is attached to the end of the tube that is outside the body. The bulb drain removes excess fluid that normally builds up in a surgical wound after surgery. The color and amount  of fluid will vary. Immediately after surgery, the fluid is bright red and is a little thicker than water. It may gradually change to a yellow or pink color and become more thin and water-like. When the amount decreases to about 1 or 2 tbsp in 24 hours, your health care provider will usually remove it.  DAILY CARE  · Keep the bulb flat (compressed) at all times, except while emptying it. The flatness creates suction. You can flatten the bulb by squeezing it firmly in the middle and then closing the cap.  · Keep sites where the tube enters the skin dry and covered with a bandage (dressing).  · Secure the tube 1-2 in (2.5-5.1 cm) below the insertion sites to keep it from pulling on your stitches. The tube is stitched in place and will not slip out.  · Secure the bulb as directed by your health care provider.  · For the first 3 days after surgery, there usually is more fluid in the bulb. Empty the bulb whenever it becomes half full because the bulb does not create enough suction if it is too full. The bulb could also overflow. Write down how much fluid you remove each time you empty your drain. Add up the amount removed in 24 hours.  · Empty the bulb at the same time every day once the amount of fluid decreases and you only need to empty it once a day. Write down the amounts and the 24-hour totals to give to your health care provider. This helps your health care provider know when the tubes can be removed.  EMPTYING THE BULB DRAIN  Before emptying the bulb, get a measuring cup, a piece of paper and a pen, and wash your hands.  · Gently run your fingers down the tube (stripping) to empty any drainage from the tubing into the bulb. This may need to be done several times a day to clear the tubing of clots and tissue.  · Open the bulb cap to release suction, which causes it to inflate. Do not touch the inside of the cap.  · Gently run your fingers down the tube (stripping) to empty any drainage from the tubing into the  bulb.  · Hold the cap out of the way, and pour fluid into the measuring cup.    · Squeeze the bulb to provide suction.   · Replace the cap.    · Check the tape that holds the tube to your skin. If it is becoming loose, you can remove the loose piece of tape and apply a new one. Then, pin the bulb to your shirt.    · Write down the amount of fluid you emptied out. Write down the date and each time you emptied your bulb drain. (If there are 2 bulbs, note the amount of drainage from each bulb and keep the totals separate. Your health care provider will want to know the total amounts for each drain and which tube is draining more.)    · Flush the fluid down the toilet and wash your hands.    · Call your health care provider once you have less than 2 tbsp of fluid collecting in the bulb drain every 24 hours.  If there is drainage around the tube site, change dressings and keep the area dry. Cleanse around tube with sterile saline and place dry gauze around site. This gauze should be changed when it is soiled. If it stays clean and unsoiled, it should still be changed daily.   SEEK MEDICAL CARE IF:  · Your drainage has a bad smell or is cloudy.    · You have a fever.    · Your drainage is increasing instead of decreasing.    · Your tube fell out.    · You have redness or swelling around the tube site.    · You have drainage from a surgical wound.    · Your bulb drain will not stay flat after you empty it.    MAKE SURE YOU:   · Understand these instructions.  · Will watch your condition.  · Will get help right away if you are not doing well or get worse.     This information is not intended to replace advice given to you by your health care provider. Make sure you discuss any questions you have with your health care provider.     Document Released: 12/15/2001 Document Revised: 01/08/2016 Document Reviewed: 05/22/2013  [employer] Interactive Patient Education ©2016 [employer]      Depression / Suicide Risk    As you are  discharged from this [facility] facility, it is important to learn how to keep safe from harming yourself.    Recognize the warning signs:  · Abrupt changes in personality, positive or negative- including increase in energy   · Giving away possessions  · Change in eating patterns- significant weight changes-  positive or negative  · Change in sleeping patterns- unable to sleep or sleeping all the time   · Unwillingness or inability to communicate  · Depression  · Unusual sadness, discouragement and loneliness  · Talk of wanting to die  · Neglect of personal appearance   · Rebelliousness- reckless behavior  · Withdrawal from people/activities they love  · Confusion- inability to concentrate     If you or a loved one observes any of these behaviors or has concerns about self-harm, here's what you can do:  · Talk about it- your feelings and reasons for harming yourself  · Remove any means that you might use to hurt yourself (examples: pills, rope, extension cords, firearm)  · Get professional help from the community (Mental Health, Substance Abuse, psychological counseling)  · Do not be alone:Call your Safe Contact- someone whom you trust who will be there for you.  · Call your local CRISIS HOTLINE 435-4951 or 911-037-6296  · Call your local Children's Mobile Crisis Response Team [place] (574) 159-5506 or www.Oceen  · Call the toll free National Suicide Prevention Hotlines   · National Suicide Prevention Lifeline 278-028-LGSN (0286)  · National Hope Line Network 800-SUICIDE (468-1034)        Follow-up Information     1. Follow up with Ilene Goff M.D..    Specialties:  Surgery, Radiology    Contact information    [address]  [address]  [facility] [address]  [phone]           Discharge Medication Instructions:    Below are the medications your physician expects you to take upon discharge:    Review all your home medications and newly ordered medications with your doctor and/or pharmacist. Follow  medication instructions as directed by your doctor and/or pharmacist.    Please keep your medication list with you and share with your physician.               Medication List      START taking these medications        Instructions    Morning Afternoon Evening Bedtime    clindamycin 300 MG Caps   Commonly known as:  CLEOCIN        Take 1 Cap by mouth 4 times a day.   Dose:  300 mg                          CONTINUE taking these medications        Instructions    Morning Afternoon Evening Bedtime    gabapentin 300 MG Caps   Commonly known as:  NEURONTIN        Take 1 Cap by mouth 3 times a day.   Dose:  300 mg                        lisinopril-hydrochlorothiazide 20-12.5 MG per tablet   Commonly known as:  PRINZIDE, ZESTORETIC        Take 1 Tab by mouth every day.   Dose:  1 Tab                        MULTIVITAMIN ADULT PO        Take  by mouth.                        oxyCODONE CR 40 MG T12a tablet   Last time this was given:  40 mg on 7/21/2017  8:56 AM   Commonly known as:  OXYCONTIN        Take 40 mg by mouth 3 times a day. Indications: Chronic Pain   Dose:  40 mg                        oxycodone-acetaminophen  MG Tabs   Commonly known as:  PERCOCET-10        Take 1-2 Tabs by mouth every four hours as needed.   Dose:  1-2 Tab                        VITAMIN C PO        Take  by mouth.                        Vitamin D 2000 UNITS Caps        Take  by mouth every day.                          STOP taking these medications     aspirin 81 MG Chew chewable tablet   Commonly known as:  ASA                    Where to Get Your Medications      These medications were sent to [facility] - [address]  [address] [place] [address] A [address] [place] [address]     Phone:  [phone]    - clindamycin 300 MG Caps            Instructions           Diet / Nutrition:    Follow any diet instructions given to you by your doctor or the dietician, including how much salt (sodium) you are allowed each  day.    If you are overweight, talk to your doctor about a weight reduction plan.    Activity:    Remain physically active following your doctor's instructions about exercise and activity.    Rest often.     Any time you become even a little tired or short of breath, SIT DOWN and rest.    Worsening Symptoms:    Report any of the following signs and symptoms to the doctor's office immediately:    *Pain of jaw, arm, or neck  *Chest pain not relieved by medication                               *Dizziness or loss of consciousness  *Difficulty breathing even when at rest   *More tired than usual                                       *Bleeding drainage or swelling of surgical site  *Swelling of feet, ankles, legs or stomach                 *Fever (>100ºF)  *Pink or blood tinged sputum  *Weight gain (3lbs/day or 5lbs /week)           *Shock from internal defibrillator (if applicable)  *Palpitations or irregular heartbeats                *Cool and/or numb extremities    Stroke Awareness    Common Risk Factors for Stroke include:    Age  Atrial Fibrillation  Carotid Artery Stenosis  Diabetes Mellitus  Excessive alcohol consumption  High blood pressure  Overweight   Physical inactivity  Smoking    Warning signs and symptoms of a stroke include:    *Sudden numbness or weakness of the face, arm or leg (especially on one side of the body).  *Sudden confusion, trouble speaking or understanding.  *Sudden trouble seeing in one or both eyes.  *Sudden trouble walking, dizziness, loss of balance or coordination.Sudden severe headache with no known cause.    It is very important to get treatment quickly when a stroke occurs. If you experience any of the above warning signs, call 911 immediately.                   Disclaimer         Quit Smoking / Tobacco Use:    I understand the use of any tobacco products increases my chance of suffering from future heart disease or stroke and could cause other illnesses which may shorten my life.  Quitting the use of tobacco products is the single most important thing I can do to improve my health. For further information on smoking / tobacco cessation call a Toll Free Quit Line at 1-797.807.4236 (*National Cancer [place]) or 1-763.796.3118 (American Lung Association) or you can access the web based program at www.lungusa.org.    [place] Tobacco Users Help Line:  (792) 955-1641       Toll Free: 1-226.727.4744  Quit Tobacco Program [facility] (035)093-5265    Crisis Hotline:    [place] Crisis Hotline:  1-548-HQXGUVV or 1-755.320.7742    [place] Crisis Hotline:    1-792.673.2347 or 279-445-1480    Discharge Survey:   Thank you for choosing [facility]. We hope we did everything we could to make your hospital stay a pleasant one. You may be receiving a phone survey and we would appreciate your time and participation in answering the questions. Your input is very valuable to us in our efforts to improve our service to our patients and their families.        My signature on this form indicates that:    1. I have reviewed and understand the above information.  2. My questions regarding this information have been answered to my satisfaction.  3. I have formulated a plan with my discharge nurse to obtain my prescribed medications for home.                  Disclaimer         __________________________________                     __________       ________                       Patient Signature                                                 Date                    Time

## 2017-07-19 NOTE — IP AVS SNAPSHOT
" <p align=\"LEFT\"><IMG SRC=\"[email or web address]\" alt=\"Image\" WIDTH=\"50%\" HEIGHT=\"200\" BORDER=\"\"></p>                   Name:Meghan Mcrae  Medical Record Number:[id number]  CSN: [id number]    YOB: 1950   Age: 66 y.o.  Sex: female  HT:1.753 m (5' 9\") WT: 105 kg (231 lb 7.7 oz)          Admit Date: 7/19/2017     Discharge Date:   Today's Date: 7/21/2017  Attending Doctor:  Ilene Goff M.D.                  Allergies:  Penicillins          Follow-up Information     1. Follow up with Ilene Goff M.D..    Specialties:  Surgery, Radiology    Contact information    [address] [address]  [phone]           Medication List      Take these Medications        Instructions    clindamycin 300 MG Caps   Commonly known as:  CLEOCIN    Take 1 Cap by mouth 4 times a day.   Dose:  300 mg       gabapentin 300 MG Caps   Commonly known as:  NEURONTIN    Take 1 Cap by mouth 3 times a day.   Dose:  300 mg       lisinopril-hydrochlorothiazide 20-12.5 MG per tablet   Commonly known as:  PRINZIDE, ZESTORETIC    Take 1 Tab by mouth every day.   Dose:  1 Tab       MULTIVITAMIN ADULT PO    Take  by mouth.       oxyCODONE CR 40 MG T12a tablet   Commonly known as:  OXYCONTIN    Take 40 mg by mouth 3 times a day. Indications: Chronic Pain   Dose:  40 mg       oxycodone-acetaminophen  MG Tabs   Commonly known as:  PERCOCET-10    Take 1-2 Tabs by mouth every four hours as needed.   Dose:  1-2 Tab       VITAMIN C PO    Take  by mouth.       Vitamin D 2000 UNITS Caps    Take  by mouth every day.         "

## 2017-07-20 LAB
ANION GAP SERPL CALC-SCNC: 5 MMOL/L (ref 0–11.9)
BASOPHILS # BLD AUTO: 0.8 % (ref 0–1.8)
BASOPHILS # BLD: 0.04 K/UL (ref 0–0.12)
BUN SERPL-MCNC: 11 MG/DL (ref 8–22)
CALCIUM SERPL-MCNC: 8.8 MG/DL (ref 8.4–10.2)
CHLORIDE SERPL-SCNC: 99 MMOL/L (ref 96–112)
CO2 SERPL-SCNC: 27 MMOL/L (ref 20–33)
CREAT SERPL-MCNC: 0.89 MG/DL (ref 0.5–1.4)
EOSINOPHIL # BLD AUTO: 0.18 K/UL (ref 0–0.51)
EOSINOPHIL NFR BLD: 3.8 % (ref 0–6.9)
ERYTHROCYTE [DISTWIDTH] IN BLOOD BY AUTOMATED COUNT: 51.1 FL (ref 35.9–50)
GFR SERPL CREATININE-BSD FRML MDRD: >60 ML/MIN/1.73 M 2
GLUCOSE SERPL-MCNC: 92 MG/DL (ref 65–99)
HCT VFR BLD AUTO: 34.6 % (ref 37–47)
HGB BLD-MCNC: 11 G/DL (ref 12–16)
IMM GRANULOCYTES # BLD AUTO: 0.01 K/UL (ref 0–0.11)
IMM GRANULOCYTES NFR BLD AUTO: 0.2 % (ref 0–0.9)
LYMPHOCYTES # BLD AUTO: 1.1 K/UL (ref 1–4.8)
LYMPHOCYTES NFR BLD: 23.3 % (ref 22–41)
MCH RBC QN AUTO: 29.3 PG (ref 27–33)
MCHC RBC AUTO-ENTMCNC: 31.8 G/DL (ref 33.6–35)
MCV RBC AUTO: 92 FL (ref 81.4–97.8)
MONOCYTES # BLD AUTO: 0.56 K/UL (ref 0–0.85)
MONOCYTES NFR BLD AUTO: 11.8 % (ref 0–13.4)
NEUTROPHILS # BLD AUTO: 2.84 K/UL (ref 2–7.15)
NEUTROPHILS NFR BLD: 60.1 % (ref 44–72)
NRBC # BLD AUTO: 0 K/UL
NRBC BLD AUTO-RTO: 0 /100 WBC
PLATELET # BLD AUTO: 168 K/UL (ref 164–446)
PMV BLD AUTO: 10.1 FL (ref 9–12.9)
POTASSIUM SERPL-SCNC: 4.3 MMOL/L (ref 3.6–5.5)
RBC # BLD AUTO: 3.76 M/UL (ref 4.2–5.4)
SODIUM SERPL-SCNC: 131 MMOL/L (ref 135–145)
WBC # BLD AUTO: 4.7 K/UL (ref 4.8–10.8)

## 2017-07-20 PROCEDURE — 36415 COLL VENOUS BLD VENIPUNCTURE: CPT

## 2017-07-20 PROCEDURE — 501838 HCHG SUTURE GENERAL: Performed by: SURGERY

## 2017-07-20 PROCEDURE — 500389 HCHG DRAIN, RESERVOIR SUCT JP 100CC: Performed by: SURGERY

## 2017-07-20 PROCEDURE — 160035 HCHG PACU - 1ST 60 MINS PHASE I: Performed by: SURGERY

## 2017-07-20 PROCEDURE — 0J9M00Z DRAINAGE OF LEFT UPPER LEG SUBCUTANEOUS TISSUE AND FASCIA WITH DRAINAGE DEVICE, OPEN APPROACH: ICD-10-PCS | Performed by: SURGERY

## 2017-07-20 PROCEDURE — 501487 HCHG STRYKER TIP: Performed by: SURGERY

## 2017-07-20 PROCEDURE — 501486 HCHG STRYKER IRRIG SET HC W/TUBING: Performed by: SURGERY

## 2017-07-20 PROCEDURE — 80048 BASIC METABOLIC PNL TOTAL CA: CPT

## 2017-07-20 PROCEDURE — 160039 HCHG SURGERY MINUTES - EA ADDL 1 MIN LEVEL 3: Performed by: SURGERY

## 2017-07-20 PROCEDURE — 160028 HCHG SURGERY MINUTES - 1ST 30 MINS LEVEL 3: Performed by: SURGERY

## 2017-07-20 PROCEDURE — 700111 HCHG RX REV CODE 636 W/ 250 OVERRIDE (IP)

## 2017-07-20 PROCEDURE — 700101 HCHG RX REV CODE 250

## 2017-07-20 PROCEDURE — 87070 CULTURE OTHR SPECIMN AEROBIC: CPT

## 2017-07-20 PROCEDURE — A6402 STERILE GAUZE <= 16 SQ IN: HCPCS | Performed by: SURGERY

## 2017-07-20 PROCEDURE — 500368 HCHG DRAIN, 7MM FLAT-FLUTED: Performed by: SURGERY

## 2017-07-20 PROCEDURE — 770006 HCHG ROOM/CARE - MED/SURG/GYN SEMI*

## 2017-07-20 PROCEDURE — 85025 COMPLETE CBC W/AUTO DIFF WBC: CPT

## 2017-07-20 PROCEDURE — 87205 SMEAR GRAM STAIN: CPT

## 2017-07-20 PROCEDURE — 700101 HCHG RX REV CODE 250: Performed by: SURGERY

## 2017-07-20 PROCEDURE — 700102 HCHG RX REV CODE 250 W/ 637 OVERRIDE(OP)

## 2017-07-20 PROCEDURE — A9270 NON-COVERED ITEM OR SERVICE: HCPCS

## 2017-07-20 PROCEDURE — A9270 NON-COVERED ITEM OR SERVICE: HCPCS | Performed by: SURGERY

## 2017-07-20 PROCEDURE — 160002 HCHG RECOVERY MINUTES (STAT): Performed by: SURGERY

## 2017-07-20 PROCEDURE — 700102 HCHG RX REV CODE 250 W/ 637 OVERRIDE(OP): Performed by: SURGERY

## 2017-07-20 PROCEDURE — 87075 CULTR BACTERIA EXCEPT BLOOD: CPT

## 2017-07-20 PROCEDURE — 500122 HCHG BOVIE, BLADE: Performed by: SURGERY

## 2017-07-20 PROCEDURE — 160048 HCHG OR STATISTICAL LEVEL 1-5: Performed by: SURGERY

## 2017-07-20 PROCEDURE — 160009 HCHG ANES TIME/MIN: Performed by: SURGERY

## 2017-07-20 PROCEDURE — 700111 HCHG RX REV CODE 636 W/ 250 OVERRIDE (IP): Performed by: SURGERY

## 2017-07-20 PROCEDURE — 700105 HCHG RX REV CODE 258: Performed by: SURGERY

## 2017-07-20 RX ORDER — BUPIVACAINE HYDROCHLORIDE AND EPINEPHRINE 5; 5 MG/ML; UG/ML
INJECTION, SOLUTION EPIDURAL; INTRACAUDAL; PERINEURAL
Status: DISCONTINUED | OUTPATIENT
Start: 2017-07-20 | End: 2017-07-20 | Stop reason: HOSPADM

## 2017-07-20 RX ORDER — DEXTROSE MONOHYDRATE, SODIUM CHLORIDE, AND POTASSIUM CHLORIDE 50; 1.49; 9 G/1000ML; G/1000ML; G/1000ML
INJECTION, SOLUTION INTRAVENOUS CONTINUOUS
Status: DISCONTINUED | OUTPATIENT
Start: 2017-07-20 | End: 2017-07-21 | Stop reason: HOSPADM

## 2017-07-20 RX ORDER — ONDANSETRON 2 MG/ML
INJECTION INTRAMUSCULAR; INTRAVENOUS
Status: DISPENSED
Start: 2017-07-20 | End: 2017-07-21

## 2017-07-20 RX ORDER — OXYCODONE HCL 5 MG/5 ML
SOLUTION, ORAL ORAL
Status: COMPLETED
Start: 2017-07-20 | End: 2017-07-20

## 2017-07-20 RX ADMIN — MORPHINE SULFATE 4 MG: 4 INJECTION INTRAVENOUS at 09:57

## 2017-07-20 RX ADMIN — ENOXAPARIN SODIUM 30 MG: 100 INJECTION SUBCUTANEOUS at 22:28

## 2017-07-20 RX ADMIN — POTASSIUM CHLORIDE, DEXTROSE MONOHYDRATE AND SODIUM CHLORIDE: 150; 5; 900 INJECTION, SOLUTION INTRAVENOUS at 12:10

## 2017-07-20 RX ADMIN — OXYCODONE HYDROCHLORIDE 40 MG: 20 TABLET, FILM COATED, EXTENDED RELEASE ORAL at 17:36

## 2017-07-20 RX ADMIN — OXYCODONE HYDROCHLORIDE 5 MG: 5 SOLUTION ORAL at 17:19

## 2017-07-20 RX ADMIN — MORPHINE SULFATE 2 MG: 4 INJECTION INTRAVENOUS at 04:35

## 2017-07-20 RX ADMIN — ENOXAPARIN SODIUM 30 MG: 100 INJECTION SUBCUTANEOUS at 09:57

## 2017-07-20 RX ADMIN — CLINDAMYCIN PHOSPHATE 900 MG: 150 INJECTION, SOLUTION INTRAVENOUS at 14:16

## 2017-07-20 RX ADMIN — CLINDAMYCIN PHOSPHATE 900 MG: 150 INJECTION, SOLUTION INTRAVENOUS at 06:00

## 2017-07-20 RX ADMIN — POTASSIUM CHLORIDE, DEXTROSE MONOHYDRATE AND SODIUM CHLORIDE: 150; 5; 900 INJECTION, SOLUTION INTRAVENOUS at 22:28

## 2017-07-20 RX ADMIN — CLINDAMYCIN PHOSPHATE 900 MG: 150 INJECTION, SOLUTION INTRAVENOUS at 22:28

## 2017-07-20 RX ADMIN — MORPHINE SULFATE 4 MG: 4 INJECTION INTRAVENOUS at 22:28

## 2017-07-20 RX ADMIN — POTASSIUM CHLORIDE, DEXTROSE MONOHYDRATE AND SODIUM CHLORIDE: 150; 5; 450 INJECTION, SOLUTION INTRAVENOUS at 09:57

## 2017-07-20 ASSESSMENT — PAIN SCALES - GENERAL
PAINLEVEL_OUTOF10: 0
PAINLEVEL_OUTOF10: 9
PAINLEVEL_OUTOF10: 6
PAINLEVEL_OUTOF10: 9
PAINLEVEL_OUTOF10: 0
PAINLEVEL_OUTOF10: 7
PAINLEVEL_OUTOF10: 0
PAINLEVEL_OUTOF10: 0

## 2017-07-20 NOTE — OR NURSING
1629: Patient brought to PACU from OR.  Bed rails up for safety, patient alert and oriented.  Saturating at 95% on 6 lpm of O2. Respirations even, expiratory wheezes upon assessment of lung sounds.  Per Dr. Burr before patient was brought to PACU, an albuterol respiratory treatment was set up and administered upon the patient's arrival. Oxygen saturations went to 100% during treatment.  Patient's head elevated to30 degrees upon arrival, dressing to left flank in place, slight pink discharge, JOBY drain with small serosanguinous drainage.  Patient declines any pain.  Declines nausea.   1643:  Patient's respiratory treatment completed, the patient states she feels like it is easier to breath, lung sounds clear bilaterally, respirations even and unlabored.  Nasal cannula placed on patient to 3 lpm, saturating at 99%. Patient continues to deny pain or nausea.  1653: Dr. Goff at bedside speaking with patient.   1707: Patient resting in bed with eyes closed, continues to deny pain or nausea.  Called report to Lesia.  1719: Patient reports back pain, that she states is chronic.  She did not get her scheduled oxycontin this afternoon.  Patient given 10 mg oxycodone solution.  1721: Patient saturating at 98% on two liters of O2 with even, unlabored breathing, patient reports she is not having pain in her incision site.  Patient meets criteria for transfer back to room.

## 2017-07-20 NOTE — PROGRESS NOTES
Paged Dr. Reyes regarding pt's home medications. Dr. Reyes stated he was in the OR and will call back in 30.

## 2017-07-20 NOTE — PROGRESS NOTES
Spoke with akilah Morrell to order Oxycodone ER 40 mg TID. NPO at midnight, supplement with IV morphine in AM.

## 2017-07-20 NOTE — PROGRESS NOTES
2 RN skin assessment complete, skin not WDL. Cellulitis/Abscess noted on the pts LT lower abdomen/groin.

## 2017-07-20 NOTE — OP REPORT
Pre-op diagnosis:  Left groin hematoma and cellulitis  Post-op diagnosis:  Same  Procedure:  Incision and drainage of left groin hematoma  Surgeon:  Ilene Goff MD  Anesthesiologist:  Ibeth Burr MD  Anesthesia:  General  Pre-op meds:  Pt on a course of Clindamycin, received a dose just prior to arrival in Pre-Op.  ASA III     Indications:  This is a 66 year old woman who underwent a left inguinal hernia repair 2 months ago.  Over the past 3 weeks, she developed an enlarging mass in her left groin with eventual development of extensive erythema across her pubis and down her upper leg.  She was admitted yesterday for IV antibiotics with resolution of her erythema though residual ecchymosis and persistent mass which was not amenable to percutaneous drainage.  We have discussed risks and benefits, and she is ready to proceed.    Findings:  Large amount of organizing hematoma, approximately 250cc of clotted blood evacuated from the upper thigh, lateral to the hernia repair site.      Procedure:  The patient was anesthetized in a supine position then prepped with betadine and draped in sterile fashion.  Time out was confirmed.  The lateral aspect of her previous incision was opened and a pseudocapsule was incised, then a large amount of hematoma was evacuated.  It appeared to collect lateral to the hernia repair site, tracking along the fascial planes.  The wound was irrigated with the Pulse-Evac and there was no active bleeding.  Some of the pseudocapsule was excised, then a 7fr flat fluted drain was inserted and secured with a 3-0 nylon.  3-0 vicryls were used to close the deep dermal layer, then 4-0 monocryl for the skin.  Sterile dressings were applied and I was informed all counts are correct.      CC: NELLA Oh

## 2017-07-21 VITALS
RESPIRATION RATE: 16 BRPM | HEIGHT: 69 IN | DIASTOLIC BLOOD PRESSURE: 52 MMHG | SYSTOLIC BLOOD PRESSURE: 121 MMHG | TEMPERATURE: 98.2 F | HEART RATE: 76 BPM | WEIGHT: 231.48 LBS | BODY MASS INDEX: 34.29 KG/M2 | OXYGEN SATURATION: 92 %

## 2017-07-21 LAB
GRAM STN SPEC: NORMAL
SIGNIFICANT IND 70042: NORMAL
SITE SITE: NORMAL
SOURCE SOURCE: NORMAL

## 2017-07-21 PROCEDURE — 90670 PCV13 VACCINE IM: CPT | Performed by: SURGERY

## 2017-07-21 PROCEDURE — 700111 HCHG RX REV CODE 636 W/ 250 OVERRIDE (IP): Performed by: SURGERY

## 2017-07-21 PROCEDURE — A9270 NON-COVERED ITEM OR SERVICE: HCPCS | Performed by: SURGERY

## 2017-07-21 PROCEDURE — 90471 IMMUNIZATION ADMIN: CPT

## 2017-07-21 PROCEDURE — 700102 HCHG RX REV CODE 250 W/ 637 OVERRIDE(OP): Performed by: SURGERY

## 2017-07-21 PROCEDURE — 700105 HCHG RX REV CODE 258: Performed by: SURGERY

## 2017-07-21 PROCEDURE — 700101 HCHG RX REV CODE 250: Performed by: SURGERY

## 2017-07-21 RX ORDER — CLINDAMYCIN HYDROCHLORIDE 300 MG/1
300 CAPSULE ORAL 4 TIMES DAILY
Qty: 28 CAP | Refills: 1 | Status: SHIPPED | OUTPATIENT
Start: 2017-07-21 | End: 2018-01-31

## 2017-07-21 RX ADMIN — ENOXAPARIN SODIUM 30 MG: 100 INJECTION SUBCUTANEOUS at 08:56

## 2017-07-21 RX ADMIN — OXYCODONE HYDROCHLORIDE 40 MG: 20 TABLET, FILM COATED, EXTENDED RELEASE ORAL at 08:56

## 2017-07-21 RX ADMIN — PNEUMOCOCCAL 13-VALENT CONJUGATE VACCINE 0.5 ML: 2.2; 2.2; 2.2; 2.2; 2.2; 4.4; 2.2; 2.2; 2.2; 2.2; 2.2; 2.2; 2.2 INJECTION, SUSPENSION INTRAMUSCULAR at 13:29

## 2017-07-21 RX ADMIN — CLINDAMYCIN PHOSPHATE 900 MG: 150 INJECTION, SOLUTION INTRAVENOUS at 05:55

## 2017-07-21 RX ADMIN — OXYCODONE HYDROCHLORIDE 40 MG: 20 TABLET, FILM COATED, EXTENDED RELEASE ORAL at 01:33

## 2017-07-21 ASSESSMENT — PAIN SCALES - GENERAL
PAINLEVEL_OUTOF10: 4
PAINLEVEL_OUTOF10: 5

## 2017-07-21 NOTE — PROGRESS NOTES
Received report from day shift nurse. Assumed pt care at 1915. Pt is A&Ox4, resting comfortably in bed. Pt on 1L of oxygen via nasal cannula. No signs of SOB/respiratory distress noted. Assessment completed, Labs noted, VSS. Pt c/o pain to lower back of 7/10 on the scale of 0-10, given pain meds per MAR. Fall precautions in place. Bed at lowest position. Bed locked. Call light and personal belongings within reach. Encouraged pt to call for any assistance. Continue to monitor

## 2017-07-21 NOTE — CARE PLAN
Problem: Safety  Goal: Will remain free from falls  Fall precautions remain in place: treaded socks on pt, appropriate signs on doorway, IV pole on side of bathroom, lowest bed rails down, bed in lowest position and locked, call light and phone within reach.

## 2017-07-21 NOTE — CARE PLAN
Problem: Venous Thromboembolism (VTW)/Deep Vein Thrombosis (DVT) Prevention:  Goal: Patient will participate in Venous Thrombosis (VTE)/Deep Vein Thrombosis (DVT)Prevention Measures    07/20/17 1640   OTHER   Risk Assessment Score 4   VTE RISK High   Pharmacologic Prophylaxis Used LMWH: Enoxaparin(Lovenox)

## 2017-07-21 NOTE — PROGRESS NOTES
Received report resting in bed alert and oriented x4,minimal pain verbalized.JOBY to left groin area with moderate sero sang.drainage,dressing around drain with moderate drainage.Discussed POC,JOBY teaching and verbalized understanding.Instructed to call for any needs call light with in reach.

## 2017-07-21 NOTE — PROGRESS NOTES
Pt arrived on GSU unit, no signs of distress, breath sounds are clear, denies n/v or pain at surgical site, dressing in place, with scant drainage, JOBY drain in place. Left groin area is soft, improvement from pre op. POC dicussed. Safety measures in place.

## 2017-07-21 NOTE — DISCHARGE SUMMARY
This 66 year old woman was admitted 7/19/17 for left groin and upper leg cellulitis and presumed hematoma associated with recent inguinal hernia repair.  She had an excellent response to Clindamycin and yesterday underwent evacuation of hematoma with placement of drain.  Today she feels great.  No pain at the surgical site.  Serosanguinous drainage, no evidence of cellulitis.      She will be discharged home with JOBY education and oral Clindamycin, and will resume her usual home meds.  She was asked to call my office today to arrange for PO next Monday but to contact us sooner if she develops excessive bleeding, dizziness/lightheadedness, recurrent erythema or mass, etc.      CC: NELLA Oh

## 2017-07-21 NOTE — PROGRESS NOTES
Prevnar given without any reactions.Discharge instructions given and verbalized understanding.Will facilitate discharge via wheelchair.

## 2017-07-21 NOTE — DISCHARGE INSTRUCTIONS
Walk regularly.  Record JOBY drainage daily and bring written record to PO visit.  Call Dr. Goff's office today to make appt for Monday if possible.  Continue Clindamycin.  Call if recurrent erythema, fevers/chills, excessive bleeding, dizziness/lightheadedness, or other questions or concerns.  May shower with drain site covered in waterproof dressing.      Discharge Instructions    Discharged to home by car with relative. Discharged via wheelchair, hospital escort: Yes.  Special equipment needed: JOBY drain    Be sure to schedule a follow-up appointment with your primary care doctor or any specialists as instructed.     Discharge Plan:   Diet Plan: Discussed  Activity Level: Discussed  Smoking Cessation Offered: Patient Refused  Confirmed Follow up Appointment: Patient to Call and Schedule Appointment  Confirmed Symptoms Management: Discussed  Medication Reconciliation Updated: Yes  Influenza Vaccine Indication: Patient Refuses    I understand that a diet low in cholesterol, fat, and sodium is recommended for good health. Unless I have been given specific instructions below for another diet, I accept this instruction as my diet prescription.   Other diet: as tolerated    Special Instructions: None    · Is patient discharged on Warfarin / Coumadin?   No     · Is patient Post Blood Transfusion?  No    Incision and Drainage, Care After  Refer to this sheet in the next few weeks. These instructions provide you with information on caring for yourself after your procedure. Your caregiver may also give you more specific instructions. Your treatment has been planned according to current medical practices, but problems sometimes occur. Call your caregiver if you have any problems or questions after your procedure.  HOME CARE INSTRUCTIONS   · If antibiotic medicine is given, take it as directed. Finish it even if you start to feel better.  · Only take over-the-counter or prescription medicines for pain, discomfort, or fever as  directed by your caregiver.  · Keep all follow-up appointments as directed by your caregiver.  · Change any bandages (dressings) as directed by your caregiver. Replace old dressings with clean dressings.  · Wash your hands before and after caring for your wound.  You will receive specific instructions for cleansing and caring for your wound.   SEEK MEDICAL CARE IF:   · You have increased pain, swelling, or redness around the wound.  · You have increased drainage, smell, or bleeding from the wound.  · You have muscle aches, chills, or you feel generally sick.  · You have a fever.  MAKE SURE YOU:   · Understand these instructions.  · Will watch your condition.  · Will get help right away if you are not doing well or get worse.     This information is not intended to replace advice given to you by your health care provider. Make sure you discuss any questions you have with your health care provider.     Document Released: 03/11/2013 Document Revised: 01/08/2016 Document Reviewed: 03/11/2013  [email or web address] Interactive Patient Education ©2016 [employer]      Bulb Drain Home Care  A bulb drain consists of a thin rubber tube and a soft, round bulb that creates a gentle suction. The rubber tube is placed in the area where you had surgery. A bulb is attached to the end of the tube that is outside the body. The bulb drain removes excess fluid that normally builds up in a surgical wound after surgery. The color and amount of fluid will vary. Immediately after surgery, the fluid is bright red and is a little thicker than water. It may gradually change to a yellow or pink color and become more thin and water-like. When the amount decreases to about 1 or 2 tbsp in 24 hours, your health care provider will usually remove it.  DAILY CARE  · Keep the bulb flat (compressed) at all times, except while emptying it. The flatness creates suction. You can flatten the bulb by squeezing it firmly in the middle and then closing the cap.  · Keep  sites where the tube enters the skin dry and covered with a bandage (dressing).  · Secure the tube 1-2 in (2.5-5.1 cm) below the insertion sites to keep it from pulling on your stitches. The tube is stitched in place and will not slip out.  · Secure the bulb as directed by your health care provider.  · For the first 3 days after surgery, there usually is more fluid in the bulb. Empty the bulb whenever it becomes half full because the bulb does not create enough suction if it is too full. The bulb could also overflow. Write down how much fluid you remove each time you empty your drain. Add up the amount removed in 24 hours.  · Empty the bulb at the same time every day once the amount of fluid decreases and you only need to empty it once a day. Write down the amounts and the 24-hour totals to give to your health care provider. This helps your health care provider know when the tubes can be removed.  EMPTYING THE BULB DRAIN  Before emptying the bulb, get a measuring cup, a piece of paper and a pen, and wash your hands.  · Gently run your fingers down the tube (stripping) to empty any drainage from the tubing into the bulb. This may need to be done several times a day to clear the tubing of clots and tissue.  · Open the bulb cap to release suction, which causes it to inflate. Do not touch the inside of the cap.  · Gently run your fingers down the tube (stripping) to empty any drainage from the tubing into the bulb.  · Hold the cap out of the way, and pour fluid into the measuring cup.    · Squeeze the bulb to provide suction.   · Replace the cap.    · Check the tape that holds the tube to your skin. If it is becoming loose, you can remove the loose piece of tape and apply a new one. Then, pin the bulb to your shirt.    · Write down the amount of fluid you emptied out. Write down the date and each time you emptied your bulb drain. (If there are 2 bulbs, note the amount of drainage from each bulb and keep the totals  separate. Your health care provider will want to know the total amounts for each drain and which tube is draining more.)    · Flush the fluid down the toilet and wash your hands.    · Call your health care provider once you have less than 2 tbsp of fluid collecting in the bulb drain every 24 hours.  If there is drainage around the tube site, change dressings and keep the area dry. Cleanse around tube with sterile saline and place dry gauze around site. This gauze should be changed when it is soiled. If it stays clean and unsoiled, it should still be changed daily.   SEEK MEDICAL CARE IF:  · Your drainage has a bad smell or is cloudy.    · You have a fever.    · Your drainage is increasing instead of decreasing.    · Your tube fell out.    · You have redness or swelling around the tube site.    · You have drainage from a surgical wound.    · Your bulb drain will not stay flat after you empty it.    MAKE SURE YOU:   · Understand these instructions.  · Will watch your condition.  · Will get help right away if you are not doing well or get worse.     This information is not intended to replace advice given to you by your health care provider. Make sure you discuss any questions you have with your health care provider.     Document Released: 12/15/2001 Document Revised: 01/08/2016 Document Reviewed: 05/22/2013  [employer] Interactive Patient Education ©2016 [employer]      Depression / Suicide Risk    As you are discharged from this [facility] facility, it is important to learn how to keep safe from harming yourself.    Recognize the warning signs:  · Abrupt changes in personality, positive or negative- including increase in energy   · Giving away possessions  · Change in eating patterns- significant weight changes-  positive or negative  · Change in sleeping patterns- unable to sleep or sleeping all the time   · Unwillingness or inability to communicate  · Depression  · Unusual sadness, discouragement and  loneliness  · Talk of wanting to die  · Neglect of personal appearance   · Rebelliousness- reckless behavior  · Withdrawal from people/activities they love  · Confusion- inability to concentrate     If you or a loved one observes any of these behaviors or has concerns about self-harm, here's what you can do:  · Talk about it- your feelings and reasons for harming yourself  · Remove any means that you might use to hurt yourself (examples: pills, rope, extension cords, firearm)  · Get professional help from the community (Mental Health, Substance Abuse, psychological counseling)  · Do not be alone:Call your Safe Contact- someone whom you trust who will be there for you.  · Call your local CRISIS HOTLINE 883-4361 or 876-782-2121  · Call your local Children's Mobile Crisis Response Team [place] (427) 388-3486 or www.[employer]  · Call the toll free National Suicide Prevention Hotlines   · National Suicide Prevention Lifeline 364-015-RNEG (3677)  · National Hope Line Network 800-SUICIDE (600-0054)

## 2017-07-21 NOTE — PROGRESS NOTES
Surgical Progress Note    Author: Ilenekenia Goff Date & Time created: 2017   12:57 PM     Interval Events:  Feels well.  Only pain is her chronic back pain.  Denies dizziness or lightheadedness.     ROS  Hemodynamics:  Temp (24hrs), Av.7 °C (98.1 °F), Min:36.4 °C (97.6 °F), Max:37.2 °C (99 °F)  Temperature: 36.8 °C (98.2 °F)  Pulse  Av  Min: 63  Max: 92Heart Rate (Monitored): 74  Blood Pressure : 121/52 mmHg     Respiratory:    Respiration: 16, Pulse Oximetry: 92 %        RUL Breath Sounds: Expiratory Wheezes, RML Breath Sounds: Clear;Diminished, RLL Breath Sounds: Expiratory Wheezes, ANA LILIA Breath Sounds: Clear, LLL Breath Sounds: Expiratory Wheezes  Neuro:  GCS       Fluids:    Intake/Output Summary (Last 24 hours) at 17 1257  Last data filed at 17 0946   Gross per 24 hour   Intake   3100 ml   Output   1020 ml   Net   2080 ml        Current Diet Order   Procedures   • DIET ORDER     Physical Exam   Alert, animated.  Looks great.  No erythema.  Serosanguinous fluid in JOBY.    Labs:  Recent Results (from the past 24 hour(s))   GRAM STAIN    Collection Time: 17  4:00 PM   Result Value Ref Range    Significant Indicator .     Source WND     Site Left groin     Gram Stain Result Rare WBCs.  No organisms seen.        Medical Decision Making, by Problem:  There are no hospital problems to display for this patient.    Plan:  DC home on oral antibiotics and JOBY.  FU next week, pt to call.      Quality Measures:  Core Measures

## 2017-07-21 NOTE — CARE PLAN
Problem: Safety  Goal: Will remain free from injury  Outcome: PROGRESSING AS EXPECTED  Pt is injury-free at this moment   Fall precautions in place. Bed locked. Bed at lowest position   Call light and personal belongings within reach.  Educated pt the importance to call for any assistance, especially to the bathroom  Hourly rounding in progress.     Problem: Infection  Goal: Will remain free from infection  Outcome: PROGRESSING AS EXPECTED  Pt is afebrile at this moment   No signs and symptoms of infection noted  Surgical dressing to L groin intact, minimal sanguinous drainage noted; no purulent drainage noted from the dressing.  Continue to monitor        Problem: Pain Management  Goal: Pain level will decrease to patient’s comfort goal  Outcome: PROGRESSING AS EXPECTED  Pt c/o of pain to mid-lower back of 7/10 on the scale of 0-10.   Pt taken Morphine 4mg per MAR and pt had scheduled Oxycotin 40mg   Pt currently resting comfortably in bed.  Continue to monitor pain and administrate pain med around the clock to keep pain under control

## 2017-07-24 LAB
BACTERIA WND AEROBE CULT: NORMAL
GRAM STN SPEC: NORMAL
SIGNIFICANT IND 70042: NORMAL
SITE SITE: NORMAL
SOURCE SOURCE: NORMAL

## 2017-07-26 LAB
BACTERIA SPEC ANAEROBE CULT: NORMAL
SIGNIFICANT IND 70042: NORMAL
SITE SITE: NORMAL
SOURCE SOURCE: NORMAL

## 2017-08-02 ENCOUNTER — HOSPITAL ENCOUNTER (OUTPATIENT)
Facility: MEDICAL CENTER | Age: 67
End: 2017-08-02
Attending: SURGERY
Payer: COMMERCIAL

## 2017-08-02 LAB
BODY FLD TYPE: NORMAL
CREAT FLD-MCNC: 0.6 MG/DL

## 2017-08-02 PROCEDURE — 82570 ASSAY OF URINE CREATININE: CPT

## 2017-08-16 ENCOUNTER — APPOINTMENT (OUTPATIENT)
Dept: ADMISSIONS | Facility: MEDICAL CENTER | Age: 67
DRG: 857 | End: 2017-08-16
Attending: SURGERY
Payer: COMMERCIAL

## 2017-08-16 ENCOUNTER — HOSPITAL ENCOUNTER (OUTPATIENT)
Facility: MEDICAL CENTER | Age: 67
End: 2017-08-16
Attending: SURGERY
Payer: OTHER MISCELLANEOUS

## 2017-08-16 LAB
GRAM STN SPEC: NORMAL
SIGNIFICANT IND 70042: NORMAL
SITE SITE: NORMAL
SOURCE SOURCE: NORMAL

## 2017-08-16 PROCEDURE — 87205 SMEAR GRAM STAIN: CPT

## 2017-08-16 PROCEDURE — 87070 CULTURE OTHR SPECIMN AEROBIC: CPT

## 2017-08-16 PROCEDURE — 87075 CULTR BACTERIA EXCEPT BLOOD: CPT

## 2017-08-16 NOTE — OR NURSING
PreAdmit Appointment: Reviewed  Preparing for your procedure handout with pt via phone. Patient instructed to continue regularly prescribed medications through day before surgery. Instructed to take the following medications the day of surgery with a sip of water per Anesthesia protocol: Gabapentin, Clindamycin, Oxycodone if needed, Percocet if needed.

## 2017-08-17 ENCOUNTER — HOSPITAL ENCOUNTER (INPATIENT)
Facility: MEDICAL CENTER | Age: 67
LOS: 1 days | DRG: 857 | End: 2017-08-18
Attending: SURGERY | Admitting: SURGERY
Payer: COMMERCIAL

## 2017-08-17 PROCEDURE — 700101 HCHG RX REV CODE 250

## 2017-08-17 PROCEDURE — 770006 HCHG ROOM/CARE - MED/SURG/GYN SEMI*

## 2017-08-17 PROCEDURE — 700111 HCHG RX REV CODE 636 W/ 250 OVERRIDE (IP)

## 2017-08-17 PROCEDURE — A9270 NON-COVERED ITEM OR SERVICE: HCPCS | Performed by: SURGERY

## 2017-08-17 PROCEDURE — 160002 HCHG RECOVERY MINUTES (STAT): Performed by: SURGERY

## 2017-08-17 PROCEDURE — 0Y960ZZ DRAINAGE OF LEFT INGUINAL REGION, OPEN APPROACH: ICD-10-PCS | Performed by: SURGERY

## 2017-08-17 PROCEDURE — 500122 HCHG BOVIE, BLADE: Performed by: SURGERY

## 2017-08-17 PROCEDURE — 160039 HCHG SURGERY MINUTES - EA ADDL 1 MIN LEVEL 3: Performed by: SURGERY

## 2017-08-17 PROCEDURE — 160028 HCHG SURGERY MINUTES - 1ST 30 MINS LEVEL 3: Performed by: SURGERY

## 2017-08-17 PROCEDURE — 700102 HCHG RX REV CODE 250 W/ 637 OVERRIDE(OP): Performed by: SURGERY

## 2017-08-17 PROCEDURE — 160009 HCHG ANES TIME/MIN: Performed by: SURGERY

## 2017-08-17 PROCEDURE — 700102 HCHG RX REV CODE 250 W/ 637 OVERRIDE(OP)

## 2017-08-17 PROCEDURE — 160048 HCHG OR STATISTICAL LEVEL 1-5: Performed by: SURGERY

## 2017-08-17 PROCEDURE — 700101 HCHG RX REV CODE 250: Performed by: SURGERY

## 2017-08-17 PROCEDURE — 700111 HCHG RX REV CODE 636 W/ 250 OVERRIDE (IP): Performed by: SURGERY

## 2017-08-17 PROCEDURE — A9270 NON-COVERED ITEM OR SERVICE: HCPCS

## 2017-08-17 PROCEDURE — 160035 HCHG PACU - 1ST 60 MINS PHASE I: Performed by: SURGERY

## 2017-08-17 RX ORDER — HYDROCHLOROTHIAZIDE 25 MG/1
12.5 TABLET ORAL
Status: DISCONTINUED | OUTPATIENT
Start: 2017-08-18 | End: 2017-08-18 | Stop reason: HOSPADM

## 2017-08-17 RX ORDER — CLINDAMYCIN PHOSPHATE 600 MG/50ML
600 INJECTION, SOLUTION INTRAVENOUS EVERY 8 HOURS
Status: DISCONTINUED | OUTPATIENT
Start: 2017-08-17 | End: 2017-08-18

## 2017-08-17 RX ORDER — OXYCODONE HCL 20 MG/1
40 TABLET, FILM COATED, EXTENDED RELEASE ORAL 3 TIMES DAILY
Status: DISCONTINUED | OUTPATIENT
Start: 2017-08-17 | End: 2017-08-18 | Stop reason: HOSPADM

## 2017-08-17 RX ORDER — SODIUM CHLORIDE AND POTASSIUM CHLORIDE 150; 450 MG/100ML; MG/100ML
INJECTION, SOLUTION INTRAVENOUS CONTINUOUS
Status: DISCONTINUED | OUTPATIENT
Start: 2017-08-17 | End: 2017-08-18 | Stop reason: HOSPADM

## 2017-08-17 RX ORDER — OXYCODONE AND ACETAMINOPHEN 10; 325 MG/1; MG/1
1-2 TABLET ORAL EVERY 4 HOURS PRN
Status: DISCONTINUED | OUTPATIENT
Start: 2017-08-17 | End: 2017-08-17

## 2017-08-17 RX ORDER — OXYCODONE AND ACETAMINOPHEN 10; 325 MG/1; MG/1
1-2 TABLET ORAL EVERY 4 HOURS PRN
Status: DISCONTINUED | OUTPATIENT
Start: 2017-08-17 | End: 2017-08-18 | Stop reason: HOSPADM

## 2017-08-17 RX ORDER — GABAPENTIN 300 MG/1
300 CAPSULE ORAL 3 TIMES DAILY
Status: DISCONTINUED | OUTPATIENT
Start: 2017-08-17 | End: 2017-08-18 | Stop reason: HOSPADM

## 2017-08-17 RX ORDER — SCOLOPAMINE TRANSDERMAL SYSTEM 1 MG/1
1 PATCH, EXTENDED RELEASE TRANSDERMAL
Status: DISCONTINUED | OUTPATIENT
Start: 2017-08-17 | End: 2017-08-18 | Stop reason: HOSPADM

## 2017-08-17 RX ORDER — CLINDAMYCIN PHOSPHATE 150 MG/ML
INJECTION, SOLUTION INTRAVENOUS
Status: COMPLETED
Start: 2017-08-17 | End: 2017-08-17

## 2017-08-17 RX ORDER — SODIUM CHLORIDE, SODIUM LACTATE, POTASSIUM CHLORIDE, CALCIUM CHLORIDE 600; 310; 30; 20 MG/100ML; MG/100ML; MG/100ML; MG/100ML
1000 INJECTION, SOLUTION INTRAVENOUS
Status: COMPLETED | OUTPATIENT
Start: 2017-08-17 | End: 2017-08-17

## 2017-08-17 RX ORDER — DIPHENHYDRAMINE HCL 25 MG
25 TABLET ORAL EVERY 6 HOURS PRN
Status: DISCONTINUED | OUTPATIENT
Start: 2017-08-17 | End: 2017-08-18 | Stop reason: HOSPADM

## 2017-08-17 RX ORDER — ONDANSETRON 2 MG/ML
4 INJECTION INTRAMUSCULAR; INTRAVENOUS EVERY 4 HOURS PRN
Status: DISCONTINUED | OUTPATIENT
Start: 2017-08-17 | End: 2017-08-18 | Stop reason: HOSPADM

## 2017-08-17 RX ORDER — DIPHENHYDRAMINE HYDROCHLORIDE 50 MG/ML
25 INJECTION INTRAMUSCULAR; INTRAVENOUS EVERY 6 HOURS PRN
Status: DISCONTINUED | OUTPATIENT
Start: 2017-08-17 | End: 2017-08-18 | Stop reason: HOSPADM

## 2017-08-17 RX ORDER — DIPHENHYDRAMINE HYDROCHLORIDE 50 MG/ML
25 INJECTION INTRAMUSCULAR; INTRAVENOUS EVERY 6 HOURS PRN
Status: DISCONTINUED | OUTPATIENT
Start: 2017-08-17 | End: 2017-08-17

## 2017-08-17 RX ORDER — NICOTINE 21 MG/24HR
14 PATCH, TRANSDERMAL 24 HOURS TRANSDERMAL
Status: DISCONTINUED | OUTPATIENT
Start: 2017-08-18 | End: 2017-08-18 | Stop reason: HOSPADM

## 2017-08-17 RX ORDER — LISINOPRIL 20 MG/1
20 TABLET ORAL
Status: DISCONTINUED | OUTPATIENT
Start: 2017-08-18 | End: 2017-08-18 | Stop reason: HOSPADM

## 2017-08-17 RX ORDER — KETOROLAC TROMETHAMINE 30 MG/ML
15 INJECTION, SOLUTION INTRAMUSCULAR; INTRAVENOUS EVERY 6 HOURS
Status: DISCONTINUED | OUTPATIENT
Start: 2017-08-17 | End: 2017-08-18 | Stop reason: HOSPADM

## 2017-08-17 RX ORDER — LISINOPRIL AND HYDROCHLOROTHIAZIDE 20; 12.5 MG/1; MG/1
1 TABLET ORAL DAILY
Status: DISCONTINUED | OUTPATIENT
Start: 2017-08-17 | End: 2017-08-17

## 2017-08-17 RX ORDER — OXYCODONE HCL 5 MG/5 ML
SOLUTION, ORAL ORAL
Status: COMPLETED
Start: 2017-08-17 | End: 2017-08-17

## 2017-08-17 RX ADMIN — GABAPENTIN 300 MG: 300 CAPSULE ORAL at 20:39

## 2017-08-17 RX ADMIN — OXYCODONE AND ACETAMINOPHEN 2 TABLET: 10; 325 TABLET ORAL at 19:15

## 2017-08-17 RX ADMIN — POTASSIUM CHLORIDE AND SODIUM CHLORIDE: 450; 150 INJECTION, SOLUTION INTRAVENOUS at 20:43

## 2017-08-17 RX ADMIN — CLINDAMYCIN PHOSPHATE 600 MG: 150 INJECTION, SOLUTION INTRAMUSCULAR; INTRAVENOUS at 21:45

## 2017-08-17 RX ADMIN — KETOROLAC TROMETHAMINE 15 MG: 30 INJECTION, SOLUTION INTRAMUSCULAR at 20:40

## 2017-08-17 RX ADMIN — OXYCODONE HYDROCHLORIDE 40 MG: 20 TABLET, FILM COATED, EXTENDED RELEASE ORAL at 20:39

## 2017-08-17 RX ADMIN — SODIUM CHLORIDE, SODIUM LACTATE, POTASSIUM CHLORIDE, CALCIUM CHLORIDE 1000 ML: 600; 310; 30; 20 INJECTION, SOLUTION INTRAVENOUS at 16:50

## 2017-08-17 ASSESSMENT — PAIN SCALES - GENERAL
PAINLEVEL_OUTOF10: 2
PAINLEVEL_OUTOF10: 0
PAINLEVEL_OUTOF10: 3
PAINLEVEL_OUTOF10: 2
PAINLEVEL_OUTOF10: 0

## 2017-08-17 ASSESSMENT — PATIENT HEALTH QUESTIONNAIRE - PHQ9
SUM OF ALL RESPONSES TO PHQ QUESTIONS 1-9: 0
1. LITTLE INTEREST OR PLEASURE IN DOING THINGS: NOT AT ALL
2. FEELING DOWN, DEPRESSED, IRRITABLE, OR HOPELESS: NOT AT ALL
SUM OF ALL RESPONSES TO PHQ9 QUESTIONS 1 AND 2: 0

## 2017-08-17 ASSESSMENT — LIFESTYLE VARIABLES
EVER_SMOKED: YES
DO YOU DRINK ALCOHOL: NO

## 2017-08-17 NOTE — IP AVS SNAPSHOT
" <p align=\"LEFT\"><IMG SRC=\"//[email or web address]$/[email or web address]\" alt=\"Image\" WIDTH=\"50%\" HEIGHT=\"200\" BORDER=\"\"></p>                   Name:Meghan Mcrae  Medical Record Number:[id number]  CSN: [id number]    YOB: 1950   Age: 66 y.o.  Sex: female  HT:1.753 m (5' 9\") WT: 100.6 kg (221 lb 12.5 oz)          Admit Date: 8/17/2017     Discharge Date:   Today's Date: 8/18/2017  Attending Doctor:  Ilene Goff M.D.                  Allergies:  Contrave          Follow-up Information     1. Follow up with Ilene Goff M.D. In 1 week.    Specialties:  Surgery, Radiology    Contact information    [address]  [address] [address] [phone]           Medication List      Take these Medications        Instructions    clindamycin 300 MG Caps   Commonly known as:  CLEOCIN    Take 1 Cap by mouth 4 times a day.   Dose:  300 mg       gabapentin 300 MG Caps   Commonly known as:  NEURONTIN    Take 1 Cap by mouth 3 times a day.   Dose:  300 mg       lisinopril-hydrochlorothiazide 20-12.5 MG per tablet   Commonly known as:  PRINZIDE, ZESTORETIC    Take 1 Tab by mouth every day.   Dose:  1 Tab       MULTIVITAMIN ADULT PO    Take  by mouth.       oxyCODONE CR 40 MG T12a tablet   Commonly known as:  OXYCONTIN    Take 40 mg by mouth 3 times a day. Indications: Chronic Pain   Dose:  40 mg       oxycodone-acetaminophen  MG Tabs   Commonly known as:  PERCOCET-10    Take 1-2 Tabs by mouth every four hours as needed.   Dose:  1-2 Tab       VITAMIN C PO    Take  by mouth.       Vitamin D 2000 units Caps    Take  by mouth every day.         "

## 2017-08-17 NOTE — IP AVS SNAPSHOT
[employer] Access Code: [email or web address]  Expires: 8/20/2017  1:34 PM    Your email address is not on file at [facility].  Email Addresses are required for you to sign up for [employer], please contact [phone] to verify your personal information and to provide your email address prior to attempting to register for [employer].    Meghan Mcrae  [address]  [place] [address]    [employer]  A secure, online tool to manage your health information     [facility]’s [employer]® is a secure, online tool that connects you to your personalized health information from the privacy of your home -- day or night - making it very easy for you to manage your healthcare. Once the activation process is completed, you can even access your medical information using the [employer] ellen, which is available for free in the Apple Ellen store or Google Play store.     To learn more about [employer], visit [email or web address]    There are two levels of access available (as shown below):   My Chart Features  [facility] Primary Care Doctor [facility]  Specialists [facility]  Urgent  Care Non-[facility] Primary Care Doctor   Email your healthcare team securely and privately 24/7 X X X    Manage appointments: schedule your next appointment; view details of past/upcoming appointments X      Request prescription refills. X      View recent personal medical records, including lab and immunizations X X X X   View health record, including health history, allergies, medications X X X X   Read reports about your outpatient visits, procedures, consult and ER notes X X X X   See your discharge summary, which is a recap of your hospital and/or ER visit that includes your diagnosis, lab results, and care plan X X  X     How to register for [employer]:  Once your e-mail address has been verified, follow the following steps to sign up for [employer].     1. Go to  [email or web address]  2. Click on the Sign Up Now box, which takes you to the New Member Sign Up page. You will  need to provide the following information:  a. Enter your [employer] Access Code exactly as it appears at the top of this page. (You will not need to use this code after you’ve completed the sign-up process. If you do not sign up before the expiration date, you must request a new code.)   b. Enter your date of birth.   c. Enter your home email address.   d. Click Submit, and follow the next screen’s instructions.  3. Create a [employer] ID. This will be your [employer] login ID and cannot be changed, so think of one that is secure and easy to remember.  4. Create a [employer] password. You can change your password at any time.  5. Enter your Password Reset Question and Answer. This can be used at a later time if you forget your password.   6. Enter your e-mail address. This allows you to receive e-mail notifications when new information is available in [employer].  7. Click Sign Up. You can now view your health information.    For assistance activating your [employer] account, call [phone]

## 2017-08-17 NOTE — IP AVS SNAPSHOT
" Home Care Instructions                                                                                                                  Name:Meghan Mcrae  Medical Record Number:[id number]  CSN: [id number]    YOB: 1950   Age: 66 y.o.  Sex: female  HT:1.753 m (5' 9\") WT: 100.6 kg (221 lb 12.5 oz)          Admit Date: 8/17/2017     Discharge Date:   Today's Date: 8/18/2017  Attending Doctor:  Ilene Goff M.D.                  Allergies:  Contrave            Discharge Instructions       Wet to dry dressings BID, only loose packing above the incision line, usual packing to the rest of the wound.  Call Vicky in Dr. Goff's office to make appt for next week.  Continue Clindamycin at home.    Discharge Instructions    Discharged to home by car with relative. Discharged via wheelchair, hospital escort: Yes.  Special equipment needed: Cane    Be sure to schedule a follow-up appointment with your primary care doctor or any specialists as instructed.     Discharge Plan:   Diet Plan: Discussed  Activity Level: Discussed  Smoking Cessation Offered: Patient Refused  Confirmed Follow up Appointment: Patient to Call and Schedule Appointment  Confirmed Symptoms Management: Discussed  Medication Reconciliation Updated: Yes  Influenza Vaccine Indication: Patient Refuses    I understand that a diet low in cholesterol, fat, and sodium is recommended for good health. Unless I have been given specific instructions below for another diet, I accept this instruction as my diet prescription.   Other diet: Regular     Special Instructions:     Wound Packing  Wound packing involves placing a moistened packing material into your wound and then covering it with an outer bandage (dressing). This helps promote proper healing of deep tissue and tissue under the skin. It also helps prevent bleeding, infection, and further injury.   Wounds are packed until deep tissue has had time to heal. The time it takes for this to occur is " different for everyone. Your health care provider will show you how to pack and dress your wound. Using gloves and a sterile technique is important in order to avoid spreading germs into your wound.  RISKS AND COMPLICATIONS  · Infection.  · Delayed or abnormal healing.  HOW TO PACK YOUR WOUND  Follow your health care provider's instructions on how often you need to change dressings and pack your wound. You will likely be asked to change dressings 1-2 times a day.  Supplies Needed  · Gloves.  · Wetting solution.  · Clean bowl.  · Packing material (gauze or gauze sponges).  · Clean towels.  · Outer dressing.  · Tape.  · Cotton balls or cotton-tipped swabs.  · Small plastic bag.  Preparing Your New Packing Material  1. Make sure your work surface or countertop is clean and disinfected.  2. Wash your hands well with soap and water.  3. Put a clean towel on the counter.  4. Put a clean bowl on the towel. Be sure to only touch the outside of the bowl when handling it.  5. Pour wetting solution into the bowl.  6. Cut your packing material (gauze or sponges) to the right size for your wound. Drop it into the bowl.  7. Cut four tape strips that you will use to seal the outer dressing.  8. Put cotton balls or cotton-tipped swabs on the clean towel.  Removing the Old Packing and Dressing  1. Gently remove the old dressing and packing material.  2. Put the removed items into the plastic bag to throw away later.  3. Wash your hands well with soap and water again.  Applying New Packing Material and Dressing  1. Put on your gloves.  2. Squeeze the packing material in the bowl to release excess liquid. The packing material should be moist, but not dripping wet.  3. Gently place the packing material into the wound. Use a cotton ball or cotton-tipped swab to guide it into place, filling all of the space.  4. Dry your fingertips on the towel.  5. Open up your outer dressing supplies and put them on a dry part of the towel. Keep them  from getting wet.  6. Place the dressing over the packed wound.  7. Tape the four outer edges of the dressing in place.  8. Remove your gloves.  9. Wash your hands again with soap and water.  General Tips  · Follow your health care provider's instructions on how tightly to pack the wound. At first, the wound will be packed tightly to help stop bleeding. As the wound begins to heal inside, you will use less packing material and pack the wound loosely to allow tissue to heal slowly from the inside out.  · Keep the dressing clean and dry.  · Follow any other instructions given by your health care provider on how to aid healing. This may include applying warm or cold compresses, elevating the affected area, or wearing a compression dressing.  · Ask your health care provider about sun exposure and sunscreen when the dressings are no longer needed.  · Keep all follow-up visits with your health care provider. This is important.  SEEK MEDICAL CARE IF:  · You have drainage, redness, swelling, or pain at your wound site.  · You notice a bad smell coming from the wound site.  · Your pain is not controlled with pain medicine.  · Tissue inside your wound changes color from pink to white, yellow, or black.  · Your wound changes in size or depth.  · You have a fever.  · You have shaking chills.  · You are having trouble packing your wound.     This information is not intended to replace advice given to you by your health care provider. Make sure you discuss any questions you have with your health care provider.     Document Released: 07/15/2015 Document Reviewed: 07/15/2015  [employer] Interactive Patient Education ©2016 [employer]      Incision and Drainage, Care After  Refer to this sheet in the next few weeks. These instructions provide you with information on caring for yourself after your procedure. Your caregiver may also give you more specific instructions. Your treatment has been planned according to current medical  practices, but problems sometimes occur. Call your caregiver if you have any problems or questions after your procedure.  HOME CARE INSTRUCTIONS   · If antibiotic medicine is given, take it as directed. Finish it even if you start to feel better.  · Only take over-the-counter or prescription medicines for pain, discomfort, or fever as directed by your caregiver.  · Keep all follow-up appointments as directed by your caregiver.  · Change any bandages (dressings) as directed by your caregiver. Replace old dressings with clean dressings.  · Wash your hands before and after caring for your wound.  You will receive specific instructions for cleansing and caring for your wound.   SEEK MEDICAL CARE IF:   · You have increased pain, swelling, or redness around the wound.  · You have increased drainage, smell, or bleeding from the wound.  · You have muscle aches, chills, or you feel generally sick.  · You have a fever.  MAKE SURE YOU:   9. Understand these instructions.  10. Will watch your condition.  11. Will get help right away if you are not doing well or get worse.     This information is not intended to replace advice given to you by your health care provider. Make sure you discuss any questions you have with your health care provider.     Document Released: 03/11/2013 Document Revised: 01/08/2016 Document Reviewed: 03/11/2013  [employer] Interactive Patient Education ©2016 [employer]      · Is patient discharged on Warfarin / Coumadin?   No     · Is patient Post Blood Transfusion?  No    Depression / Suicide Risk    As you are discharged from this [facility] facility, it is important to learn how to keep safe from harming yourself.    Recognize the warning signs:  · Abrupt changes in personality, positive or negative- including increase in energy   · Giving away possessions  · Change in eating patterns- significant weight changes-  positive or negative  · Change in sleeping patterns- unable to sleep or sleeping all  the time   · Unwillingness or inability to communicate  · Depression  · Unusual sadness, discouragement and loneliness  · Talk of wanting to die  · Neglect of personal appearance   · Rebelliousness- reckless behavior  · Withdrawal from people/activities they love  · Confusion- inability to concentrate     If you or a loved one observes any of these behaviors or has concerns about self-harm, here's what you can do:  · Talk about it- your feelings and reasons for harming yourself  · Remove any means that you might use to hurt yourself (examples: pills, rope, extension cords, firearm)  · Get professional help from the community (Mental Health, Substance Abuse, psychological counseling)  · Do not be alone:Call your Safe Contact- someone whom you trust who will be there for you.  · Call your local CRISIS HOTLINE 811-6055 or 564-077-4343  · Call your local Children's Mobile Crisis Response Team [place] (603) 190-0642 or www.[employer]  · Call the toll free National Suicide Prevention Hotlines   · National Suicide Prevention Lifeline 294-649-MKCJ (6134)  · [employer] Hope Line Network 800-SUICIDE (366-0565)        Follow-up Information     1. Follow up with Ilene Goff M.D. In 1 week.    Specialties:  Surgery, Radiology    Contact information    [address]  [facility] [address]  [facility] [id number] [phone]           Discharge Medication Instructions:    Below are the medications your physician expects you to take upon discharge:    Review all your home medications and newly ordered medications with your doctor and/or pharmacist. Follow medication instructions as directed by your doctor and/or pharmacist.    Please keep your medication list with you and share with your physician.               Medication List      CONTINUE taking these medications        Instructions    Morning Afternoon Evening Bedtime    clindamycin 300 MG Caps   Commonly known as:  CLEOCIN        Take 1 Cap by mouth 4 times a day.   Dose:  300 mg                         gabapentin 300 MG Caps   Last time this was given:  300 mg on 8/18/2017  8:22 AM   Commonly known as:  NEURONTIN        Take 1 Cap by mouth 3 times a day.   Dose:  300 mg                        lisinopril-hydrochlorothiazide 20-12.5 MG per tablet   Commonly known as:  PRINZIDE, ZESTORETIC        Take 1 Tab by mouth every day.   Dose:  1 Tab                        MULTIVITAMIN ADULT PO        Take  by mouth.                        oxyCODONE CR 40 MG T12a tablet   Last time this was given:  40 mg on 8/18/2017  8:21 AM   Commonly known as:  OXYCONTIN        Take 40 mg by mouth 3 times a day. Indications: Chronic Pain   Dose:  40 mg                        oxycodone-acetaminophen  MG Tabs   Last time this was given:  2 Tabs on 8/17/2017  7:15 PM   Commonly known as:  PERCOCET-10        Take 1-2 Tabs by mouth every four hours as needed.   Dose:  1-2 Tab                        VITAMIN C PO        Take  by mouth.                        Vitamin D 2000 units Caps        Take  by mouth every day.                                Instructions           Diet / Nutrition:    Follow any diet instructions given to you by your doctor or the dietician, including how much salt (sodium) you are allowed each day.    If you are overweight, talk to your doctor about a weight reduction plan.    Activity:    Remain physically active following your doctor's instructions about exercise and activity.    Rest often.     Any time you become even a little tired or short of breath, SIT DOWN and rest.    Worsening Symptoms:    Report any of the following signs and symptoms to the doctor's office immediately:    *Pain of jaw, arm, or neck  *Chest pain not relieved by medication                               *Dizziness or loss of consciousness  *Difficulty breathing even when at rest   *More tired than usual                                       *Bleeding drainage or swelling of surgical site  *Swelling of feet, ankles, legs or  stomach                 *Fever (>100ºF)  *Pink or blood tinged sputum  *Weight gain (3lbs/day or 5lbs /week)           *Shock from internal defibrillator (if applicable)  *Palpitations or irregular heartbeats                *Cool and/or numb extremities    Stroke Awareness    Common Risk Factors for Stroke include:    Age  Atrial Fibrillation  Carotid Artery Stenosis  Diabetes Mellitus  Excessive alcohol consumption  High blood pressure  Overweight   Physical inactivity  Smoking    Warning signs and symptoms of a stroke include:    *Sudden numbness or weakness of the face, arm or leg (especially on one side of the body).  *Sudden confusion, trouble speaking or understanding.  *Sudden trouble seeing in one or both eyes.  *Sudden trouble walking, dizziness, loss of balance or coordination.Sudden severe headache with no known cause.    It is very important to get treatment quickly when a stroke occurs. If you experience any of the above warning signs, call 911 immediately.                   Disclaimer         Quit Smoking / Tobacco Use:    I understand the use of any tobacco products increases my chance of suffering from future heart disease or stroke and could cause other illnesses which may shorten my life. Quitting the use of tobacco products is the single most important thing I can do to improve my health. For further information on smoking / tobacco cessation call a Toll Free Quit Line at 1-660.441.4214 (*National Cancer [place]) or 1-772.363.7550 (American Lung Association) or you can access the web based program at www.lungusa.org.    [place] Tobacco Users Help Line:  (195) 952-3438       Toll Free: 1-574.541.7730  Quit Tobacco Program [facility] (748)791-7321    Crisis Hotline:    [place] Crisis Hotline:  1-164-USOBXXE or 1-287.176.8263    [place] Crisis Hotline:    1-561.676.4590 or 277-868-9354    Discharge Survey:   Thank you for choosing [facility]. We hope we did everything we  could to make your hospital stay a pleasant one. You may be receiving a phone survey and we would appreciate your time and participation in answering the questions. Your input is very valuable to us in our efforts to improve our service to our patients and their families.        My signature on this form indicates that:    1. I have reviewed and understand the above information.  2. My questions regarding this information have been answered to my satisfaction.  3. I have formulated a plan with my discharge nurse to obtain my prescribed medications for home.                  Disclaimer         __________________________________                     __________       ________                       Patient Signature                                                 Date                    Time

## 2017-08-17 NOTE — IP AVS SNAPSHOT
8/18/2017    Meghan Mcrae  [address]  [facility] [address]    Dear Meghan:    [facility] wants to ensure your discharge home is safe and you or your loved ones have had all of your questions answered regarding your care after you leave the hospital.    Below is a list of resources and contact information should you have any questions regarding your hospital stay, follow-up instructions, or active medical symptoms.    Questions or Concerns Regarding… Contact   Medical Questions Related to Your Discharge  (7 days a week, 8am-5pm) Contact a Nurse Care Coordinator   [phone]   Medical Questions Not Related to Your Discharge  (24 hours a day / 7 days a week)  Contact the Nurse Health Line   [phone]    Medications or Discharge Instructions Refer to your discharge packet   or contact your [facility] Primary Care Provider   [phone]   Follow-up Appointment(s) Schedule your appointment via [employer]   or contact Scheduling [phone]   Billing Review your statement via [employer]  or contact Billing [phone]   Medical Records Review your records via [employer]   or contact Medical Records [phone]     You may receive a telephone call within two days of discharge. This call is to make certain you understand your discharge instructions and have the opportunity to have any questions answered. You can also easily access your medical information, test results and upcoming appointments via the [employer] free online health management tool. You can learn more and sign up at [employer]. For assistance setting up your [employer] account, please call [phone].    Once again, we want to ensure your discharge home is safe and that you have a clear understanding of any next steps in your care. If you have any questions or concerns, please do not hesitate to contact us, we are here for you. Thank you for choosing [facility] for your healthcare needs.    Sincerely,    Your [facility] Healthcare Team

## 2017-08-18 VITALS
HEIGHT: 69 IN | BODY MASS INDEX: 32.85 KG/M2 | SYSTOLIC BLOOD PRESSURE: 135 MMHG | RESPIRATION RATE: 16 BRPM | WEIGHT: 221.78 LBS | DIASTOLIC BLOOD PRESSURE: 58 MMHG | HEART RATE: 76 BPM | OXYGEN SATURATION: 96 % | TEMPERATURE: 97.7 F

## 2017-08-18 LAB
BACTERIA WND AEROBE CULT: NORMAL
BASOPHILS # BLD AUTO: 0.3 % (ref 0–1.8)
BASOPHILS # BLD: 0.01 K/UL (ref 0–0.12)
EOSINOPHIL # BLD AUTO: 0 K/UL (ref 0–0.51)
EOSINOPHIL NFR BLD: 0 % (ref 0–6.9)
ERYTHROCYTE [DISTWIDTH] IN BLOOD BY AUTOMATED COUNT: 46.9 FL (ref 35.9–50)
GRAM STN SPEC: NORMAL
HCT VFR BLD AUTO: 35.3 % (ref 37–47)
HGB BLD-MCNC: 11.2 G/DL (ref 12–16)
IMM GRANULOCYTES # BLD AUTO: 0.02 K/UL (ref 0–0.11)
IMM GRANULOCYTES NFR BLD AUTO: 0.5 % (ref 0–0.9)
LYMPHOCYTES # BLD AUTO: 0.42 K/UL (ref 1–4.8)
LYMPHOCYTES NFR BLD: 10.9 % (ref 22–41)
MCH RBC QN AUTO: 28.6 PG (ref 27–33)
MCHC RBC AUTO-ENTMCNC: 31.7 G/DL (ref 33.6–35)
MCV RBC AUTO: 90.1 FL (ref 81.4–97.8)
MONOCYTES # BLD AUTO: 0.12 K/UL (ref 0–0.85)
MONOCYTES NFR BLD AUTO: 3.1 % (ref 0–13.4)
NEUTROPHILS # BLD AUTO: 3.27 K/UL (ref 2–7.15)
NEUTROPHILS NFR BLD: 85.2 % (ref 44–72)
NRBC # BLD AUTO: 0 K/UL
NRBC BLD AUTO-RTO: 0 /100 WBC
PLATELET # BLD AUTO: 267 K/UL (ref 164–446)
PMV BLD AUTO: 9.4 FL (ref 9–12.9)
RBC # BLD AUTO: 3.92 M/UL (ref 4.2–5.4)
SIGNIFICANT IND 70042: NORMAL
SITE SITE: NORMAL
SOURCE SOURCE: NORMAL
WBC # BLD AUTO: 3.8 K/UL (ref 4.8–10.8)

## 2017-08-18 PROCEDURE — 85025 COMPLETE CBC W/AUTO DIFF WBC: CPT

## 2017-08-18 PROCEDURE — 700111 HCHG RX REV CODE 636 W/ 250 OVERRIDE (IP): Performed by: SURGERY

## 2017-08-18 PROCEDURE — 36415 COLL VENOUS BLD VENIPUNCTURE: CPT

## 2017-08-18 PROCEDURE — 94760 N-INVAS EAR/PLS OXIMETRY 1: CPT

## 2017-08-18 PROCEDURE — 700101 HCHG RX REV CODE 250: Performed by: SURGERY

## 2017-08-18 PROCEDURE — A9270 NON-COVERED ITEM OR SERVICE: HCPCS | Performed by: SURGERY

## 2017-08-18 PROCEDURE — 700102 HCHG RX REV CODE 250 W/ 637 OVERRIDE(OP): Performed by: SURGERY

## 2017-08-18 RX ADMIN — NICOTINE 14 MG: 14 PATCH, EXTENDED RELEASE TRANSDERMAL at 05:49

## 2017-08-18 RX ADMIN — HYDROCHLOROTHIAZIDE 12.5 MG: 25 TABLET ORAL at 08:22

## 2017-08-18 RX ADMIN — KETOROLAC TROMETHAMINE 15 MG: 30 INJECTION, SOLUTION INTRAMUSCULAR at 08:22

## 2017-08-18 RX ADMIN — CLINDAMYCIN IN 5 PERCENT DEXTROSE 600 MG: 12 INJECTION, SOLUTION INTRAVENOUS at 06:00

## 2017-08-18 RX ADMIN — GABAPENTIN 300 MG: 300 CAPSULE ORAL at 08:22

## 2017-08-18 RX ADMIN — KETOROLAC TROMETHAMINE 15 MG: 30 INJECTION, SOLUTION INTRAMUSCULAR at 01:40

## 2017-08-18 RX ADMIN — OXYCODONE HYDROCHLORIDE 40 MG: 20 TABLET, FILM COATED, EXTENDED RELEASE ORAL at 08:21

## 2017-08-18 RX ADMIN — LISINOPRIL 20 MG: 20 TABLET ORAL at 08:21

## 2017-08-18 ASSESSMENT — PAIN SCALES - GENERAL
PAINLEVEL_OUTOF10: 2

## 2017-08-18 NOTE — DISCHARGE INSTRUCTIONS
Wet to dry dressings BID, only loose packing above the incision line, usual packing to the rest of the wound.  Call Vicky in Dr. Goff's office to make appt for next week.  Continue Clindamycin at home.    Discharge Instructions    Discharged to home by car with relative. Discharged via wheelchair, hospital escort: Yes.  Special equipment needed: Cane    Be sure to schedule a follow-up appointment with your primary care doctor or any specialists as instructed.     Discharge Plan:   Diet Plan: Discussed  Activity Level: Discussed  Smoking Cessation Offered: Patient Refused  Confirmed Follow up Appointment: Patient to Call and Schedule Appointment  Confirmed Symptoms Management: Discussed  Medication Reconciliation Updated: Yes  Influenza Vaccine Indication: Patient Refuses    I understand that a diet low in cholesterol, fat, and sodium is recommended for good health. Unless I have been given specific instructions below for another diet, I accept this instruction as my diet prescription.   Other diet: Regular     Special Instructions:     Wound Packing  Wound packing involves placing a moistened packing material into your wound and then covering it with an outer bandage (dressing). This helps promote proper healing of deep tissue and tissue under the skin. It also helps prevent bleeding, infection, and further injury.   Wounds are packed until deep tissue has had time to heal. The time it takes for this to occur is different for everyone. Your health care provider will show you how to pack and dress your wound. Using gloves and a sterile technique is important in order to avoid spreading germs into your wound.  RISKS AND COMPLICATIONS  · Infection.  · Delayed or abnormal healing.  HOW TO PACK YOUR WOUND  Follow your health care provider's instructions on how often you need to change dressings and pack your wound. You will likely be asked to change dressings 1-2 times a day.  Supplies Needed  · Gloves.  · Wetting  solution.  · Clean bowl.  · Packing material (gauze or gauze sponges).  · Clean towels.  · Outer dressing.  · Tape.  · Cotton balls or cotton-tipped swabs.  · Small plastic bag.  Preparing Your New Packing Material  1. Make sure your work surface or countertop is clean and disinfected.  2. Wash your hands well with soap and water.  3. Put a clean towel on the counter.  4. Put a clean bowl on the towel. Be sure to only touch the outside of the bowl when handling it.  5. Pour wetting solution into the bowl.  6. Cut your packing material (gauze or sponges) to the right size for your wound. Drop it into the bowl.  7. Cut four tape strips that you will use to seal the outer dressing.  8. Put cotton balls or cotton-tipped swabs on the clean towel.  Removing the Old Packing and Dressing  1. Gently remove the old dressing and packing material.  2. Put the removed items into the plastic bag to throw away later.  3. Wash your hands well with soap and water again.  Applying New Packing Material and Dressing  1. Put on your gloves.  2. Squeeze the packing material in the bowl to release excess liquid. The packing material should be moist, but not dripping wet.  3. Gently place the packing material into the wound. Use a cotton ball or cotton-tipped swab to guide it into place, filling all of the space.  4. Dry your fingertips on the towel.  5. Open up your outer dressing supplies and put them on a dry part of the towel. Keep them from getting wet.  6. Place the dressing over the packed wound.  7. Tape the four outer edges of the dressing in place.  8. Remove your gloves.  9. Wash your hands again with soap and water.  General Tips  · Follow your health care provider's instructions on how tightly to pack the wound. At first, the wound will be packed tightly to help stop bleeding. As the wound begins to heal inside, you will use less packing material and pack the wound loosely to allow tissue to heal slowly from the inside  out.  · Keep the dressing clean and dry.  · Follow any other instructions given by your health care provider on how to aid healing. This may include applying warm or cold compresses, elevating the affected area, or wearing a compression dressing.  · Ask your health care provider about sun exposure and sunscreen when the dressings are no longer needed.  · Keep all follow-up visits with your health care provider. This is important.  SEEK MEDICAL CARE IF:  · You have drainage, redness, swelling, or pain at your wound site.  · You notice a bad smell coming from the wound site.  · Your pain is not controlled with pain medicine.  · Tissue inside your wound changes color from pink to white, yellow, or black.  · Your wound changes in size or depth.  · You have a fever.  · You have shaking chills.  · You are having trouble packing your wound.     This information is not intended to replace advice given to you by your health care provider. Make sure you discuss any questions you have with your health care provider.     Document Released: 07/15/2015 Document Reviewed: 07/15/2015  [employer] Interactive Patient Education ©2016 [employer]      Incision and Drainage, Care After  Refer to this sheet in the next few weeks. These instructions provide you with information on caring for yourself after your procedure. Your caregiver may also give you more specific instructions. Your treatment has been planned according to current medical practices, but problems sometimes occur. Call your caregiver if you have any problems or questions after your procedure.  HOME CARE INSTRUCTIONS   · If antibiotic medicine is given, take it as directed. Finish it even if you start to feel better.  · Only take over-the-counter or prescription medicines for pain, discomfort, or fever as directed by your caregiver.  · Keep all follow-up appointments as directed by your caregiver.  · Change any bandages (dressings) as directed by your caregiver. Replace  old dressings with clean dressings.  · Wash your hands before and after caring for your wound.  You will receive specific instructions for cleansing and caring for your wound.   SEEK MEDICAL CARE IF:   · You have increased pain, swelling, or redness around the wound.  · You have increased drainage, smell, or bleeding from the wound.  · You have muscle aches, chills, or you feel generally sick.  · You have a fever.  MAKE SURE YOU:   9. Understand these instructions.  10. Will watch your condition.  11. Will get help right away if you are not doing well or get worse.     This information is not intended to replace advice given to you by your health care provider. Make sure you discuss any questions you have with your health care provider.     Document Released: 03/11/2013 Document Revised: 01/08/2016 Document Reviewed: 03/11/2013  [employer] Interactive Patient Education ©2016 [employer]      · Is patient discharged on Warfarin / Coumadin?   No     · Is patient Post Blood Transfusion?  No    Depression / Suicide Risk    As you are discharged from this [facility], it is important to learn how to keep safe from harming yourself.    Recognize the warning signs:  · Abrupt changes in personality, positive or negative- including increase in energy   · Giving away possessions  · Change in eating patterns- significant weight changes-  positive or negative  · Change in sleeping patterns- unable to sleep or sleeping all the time   · Unwillingness or inability to communicate  · Depression  · Unusual sadness, discouragement and loneliness  · Talk of wanting to die  · Neglect of personal appearance   · Rebelliousness- reckless behavior  · Withdrawal from people/activities they love  · Confusion- inability to concentrate     If you or a loved one observes any of these behaviors or has concerns about self-harm, here's what you can do:  · Talk about it- your feelings and reasons for harming yourself  · Remove any means  that you might use to hurt yourself (examples: pills, rope, extension cords, firearm)  · Get professional help from the community (Mental Health, Substance Abuse, psychological counseling)  · Do not be alone:Call your Safe Contact- someone whom you trust who will be there for you.  · Call your local CRISIS HOTLINE 863-3649 or 954-968-1893  · Call your local Children's Mobile Crisis Response Team [place] (135) 574-1418 or www.[employer]ie  · Call the toll free National Suicide Prevention Hotlines   · National Suicide Prevention Lifeline 337-921-NHPW (5315)  · National Hope Line Network 800-SUICIDE (742-1504)

## 2017-08-18 NOTE — PROGRESS NOTES
Surgical Progress Note    Author: Ilene Goff Date & Time created: 2017   7:15 AM     Interval Events:  Feels well.  Wants to go home.      ROS  Hemodynamics:  Temp (24hrs), Av.7 °C (98 °F), Min:36.2 °C (97.2 °F), Max:36.9 °C (98.4 °F)  Temperature: 36.7 °C (98.1 °F)  Pulse  Av  Min: 53  Max: 95Heart Rate (Monitored): 65  Blood Pressure : 130/59 mmHg, NIBP: 146/62 mmHg     Respiratory:    Respiration: 15, Pulse Oximetry: 100 %, O2 Daily Delivery Respiratory : Silicone Nasal Cannula        RUL Breath Sounds: Clear, RML Breath Sounds: Clear;Diminished, RLL Breath Sounds: Clear;Diminished, ANA LILIA Breath Sounds: Clear;Diminished, LLL Breath Sounds: Clear;Diminished  Neuro:  GCS       Fluids:    Intake/Output Summary (Last 24 hours) at 17 0715  Last data filed at 17 0400   Gross per 24 hour   Intake   2600 ml   Output    800 ml   Net   1800 ml     Weight: 100.6 kg (221 lb 12.5 oz)  Current Diet Order   Procedures   • DIET ORDER     Physical Exam   Looks well.  Minimal faint erythema of surrounding skin.  Some purulence at the tip of the dressing.      Labs:  Recent Results (from the past 24 hour(s))   CBC with Differential    Collection Time: 17  5:00 AM   Result Value Ref Range    WBC 3.8 (L) 4.8 - 10.8 K/uL    RBC 3.92 (L) 4.20 - 5.40 M/uL    Hemoglobin 11.2 (L) 12.0 - 16.0 g/dL    Hematocrit 35.3 (L) 37.0 - 47.0 %    MCV 90.1 81.4 - 97.8 fL    MCH 28.6 27.0 - 33.0 pg    MCHC 31.7 (L) 33.6 - 35.0 g/dL    RDW 46.9 35.9 - 50.0 fL    Platelet Count 267 164 - 446 K/uL    MPV 9.4 9.0 - 12.9 fL    Neutrophils-Polys 85.20 (H) 44.00 - 72.00 %    Lymphocytes 10.90 (L) 22.00 - 41.00 %    Monocytes 3.10 0.00 - 13.40 %    Eosinophils 0.00 0.00 - 6.90 %    Basophils 0.30 0.00 - 1.80 %    Immature Granulocytes 0.50 0.00 - 0.90 %    Nucleated RBC 0.00 /100 WBC    Neutrophils (Absolute) 3.27 2.00 - 7.15 K/uL    Lymphs (Absolute) 0.42 (L) 1.00 - 4.80 K/uL    Monos (Absolute) 0.12 0.00 - 0.85 K/uL    Eos  (Absolute) 0.00 0.00 - 0.51 K/uL    Baso (Absolute) 0.01 0.00 - 0.12 K/uL    Immature Granulocytes (abs) 0.02 0.00 - 0.11 K/uL    NRBC (Absolute) 0.00 K/uL     Medical Decision Making, by Problem:  There are no hospital problems to display for this patient.    Plan:  DC home on antibiotics.  FU in office next week.       Quality Measures:  Core Measures

## 2017-08-18 NOTE — OP REPORT
Pre op diagnosis:  Left groin abscess  Post op diagnosis:  Same  Procedure:  Exploration of left groin wound, drainage of abscess.    Surgeon:  Ilene Goff MD  Anethesiologist:  Mauro Irene MD    Anesthesia:  General  Pre op meds:  Ancef  ASA 2    Indications:  This is a 66 year old woman with left groin abscess following evacuation of a hematoma lateral to her hernia repair site.  She presents now for exploration of the wound and drainage of abcess.    Findings:  Minimal residual purulent fluid.  Cultures not repeated since they were taken in the office yesterday.  Tracked superiorly along the fascia but did not appear to track into the hernia repair site.  Overlying skin excised widely and wound packed.      Procedure summary:  The patient was anesthetized in a supine position, then prepped with betadine and draped in sterile fashion.  Time out was confirmed.  I probed the wound and unroofed the area medially and superiorly to try to avoid the skin from closing on itself.  I probed to see if the cavity tracked into the previous hernia repair site, but this did not seem to be connected and I did not want to open an otherwise clean surgical site in the setting of contamination.  Therefore we irrigated extensively and assured hemostasis, then packed the wound with a wet to dry dressing.  I was informed counts are correct.    CC: NELLA Oh

## 2017-08-18 NOTE — CARE PLAN
Problem: Safety  Goal: Will remain free from falls  Pt educated on fall risks and to call staff before getting out of bed. Fall precautions in place, hourly rounding implemented, bed alarm on, call light in reach.        Problem: Knowledge Deficit  Goal: Knowledge of disease process/condition, treatment plan, diagnostic tests, and medications will improve  Outcome: PROGRESSING AS EXPECTED  Discussed POC with pt, answered all questions, educated pt on medications and side effects. Pt verbalized understanding.

## 2017-08-18 NOTE — OR NURSING
Respirations easy in PACU. Mild discomfort, oral meds given, denies nausea. Gauze and Medipore tape clean and dry to left inguinal area.

## 2017-08-18 NOTE — DISCHARGE SUMMARY
HPI & HOSPITAL COURSE  This is a 66 y.o. female admitted for postoperative evaluation following I&D of left groin abscess.  She feels well and had no issues overnight.  She is anxious to be discharged home.        MEDICATIONS ON DISCHARGE   Meghan Mcrae   Home Medication Instructions CHELLE:[id number]    Printed on:08/18/17 4636   Medication Information                      Ascorbic Acid (VITAMIN C PO)  Take  by mouth.             Cholecalciferol (VITAMIN D) 2000 UNITS Cap  Take  by mouth every day.             clindamycin (CLEOCIN) 300 MG Cap  Take 1 Cap by mouth 4 times a day.             gabapentin (NEURONTIN) 300 MG Cap  Take 1 Cap by mouth 3 times a day.             lisinopril-hydrochlorothiazide (PRINZIDE, ZESTORETIC) 20-12.5 MG per tablet  Take 1 Tab by mouth every day.             Multiple Vitamins-Minerals (MULTIVITAMIN ADULT PO)  Take  by mouth.             oxyCODONE CR (OXYCONTIN) 40 MG Tablet Extended Release 12 hour Abuse-Deterrent tablet  Take 40 mg by mouth 3 times a day. Indications: Chronic Pain             Oxycodone-Acetaminophen (PERCOCET-10)  MG TABS  Take 1-2 Tabs by mouth every four hours as needed.               She will continue the Clindamycin and perform BID wet to dry dressings at home.  I demonstrated to the patient and the RN how the inner dressing should be placed, with only a wick tracking upward, with regular packing in the more proximal part of the wound.  She will contact us if the skin redness recurs, fevers or chills, etc.      CC: NELLA Oh

## 2017-08-18 NOTE — PROGRESS NOTES
Dr. Goff at bedside this morning at 0700, checked wound, gave instructions regarding dressing changes and cleared patient for discharge. 0955: Patient resting in bed, has been up to chair and back to bed. Tolerated breakfast well and spouse is at the bedside. Patient is ready for discharge.

## 2017-08-18 NOTE — PROGRESS NOTES
1930 Pt arrived on unit from PACU. Resting in bed, family at bedside, AOx4 discussed POC, answered all questions. Pt states pain is 2/10 and at a tolerable level at this time. Broth provided, pt tolerating well. Denies N/V. Educated on fall risks and to use bed alarm before getting out of bed. Bed alarm on, fall precautions in place, will cont with care.    2005 pt up to bathroom with cane, voided, tolerated well. Will cont with care.

## 2017-08-18 NOTE — PROGRESS NOTES
Documents mailed to patients home address per Dr. Wily Mcgowan request. Pt discharged into care of spouse. Discussed BID dressing changes with pt and spouse. They both states understanding of this and ask no further questions. Discussed follow up appointments, meds and when to call Dr. Goff. Escorted to private transportation by staff without incident.

## 2017-08-22 LAB
BACTERIA SPEC ANAEROBE CULT: ABNORMAL
BACTERIA SPEC ANAEROBE CULT: ABNORMAL
SIGNIFICANT IND 70042: ABNORMAL
SITE SITE: ABNORMAL
SOURCE SOURCE: ABNORMAL

## 2017-08-28 ENCOUNTER — HOSPITAL ENCOUNTER (OUTPATIENT)
Dept: RADIOLOGY | Facility: MEDICAL CENTER | Age: 67
End: 2017-08-28
Attending: SURGERY
Payer: OTHER MISCELLANEOUS

## 2017-08-28 ENCOUNTER — HOSPITAL ENCOUNTER (OUTPATIENT)
Facility: MEDICAL CENTER | Age: 67
End: 2017-08-28
Attending: SURGERY
Payer: OTHER MISCELLANEOUS

## 2017-08-28 DIAGNOSIS — L02.214 ABSCESS OF GROIN: ICD-10-CM

## 2017-08-28 DIAGNOSIS — T81.89XA NON-HEALING SURGICAL WOUND, INITIAL ENCOUNTER: ICD-10-CM

## 2017-08-28 PROCEDURE — 74177 CT ABD & PELVIS W/CONTRAST: CPT

## 2017-08-28 PROCEDURE — 700117 HCHG RX CONTRAST REV CODE 255: Performed by: SURGERY

## 2017-08-28 RX ADMIN — IOHEXOL 100 ML: 350 INJECTION, SOLUTION INTRAVENOUS at 15:15

## 2017-08-29 LAB
GRAM STN SPEC: NORMAL
SIGNIFICANT IND 70042: NORMAL
SITE SITE: NORMAL
SOURCE SOURCE: NORMAL

## 2017-09-01 ENCOUNTER — HOSPITAL ENCOUNTER (OUTPATIENT)
Facility: MEDICAL CENTER | Age: 67
End: 2017-09-01
Payer: OTHER MISCELLANEOUS

## 2017-09-18 ENCOUNTER — HOSPITAL ENCOUNTER (OUTPATIENT)
Dept: HOSPITAL 8 - OUT | Age: 67
End: 2017-09-18
Attending: SURGERY
Payer: COMMERCIAL

## 2017-09-18 ENCOUNTER — APPOINTMENT (OUTPATIENT)
Dept: RADIOLOGY | Facility: MEDICAL CENTER | Age: 67
End: 2017-09-18
Attending: SURGERY
Payer: OTHER MISCELLANEOUS

## 2017-09-18 VITALS — HEIGHT: 69 IN | WEIGHT: 219.8 LBS | BODY MASS INDEX: 32.56 KG/M2

## 2017-09-18 VITALS — SYSTOLIC BLOOD PRESSURE: 124 MMHG | DIASTOLIC BLOOD PRESSURE: 87 MMHG

## 2017-09-18 DIAGNOSIS — Z96.611: ICD-10-CM

## 2017-09-18 DIAGNOSIS — E66.9: ICD-10-CM

## 2017-09-18 DIAGNOSIS — F17.200: ICD-10-CM

## 2017-09-18 DIAGNOSIS — Z98.890: ICD-10-CM

## 2017-09-18 DIAGNOSIS — G89.4: ICD-10-CM

## 2017-09-18 DIAGNOSIS — Z96.612: ICD-10-CM

## 2017-09-18 DIAGNOSIS — I10: ICD-10-CM

## 2017-09-18 DIAGNOSIS — L02.214: Primary | ICD-10-CM

## 2017-09-18 DIAGNOSIS — Z96.651: ICD-10-CM

## 2017-09-18 PROCEDURE — 87070 CULTURE OTHR SPECIMN AEROBIC: CPT

## 2017-09-18 PROCEDURE — 49405 IMAGE CATH FLUID COLXN VISC: CPT

## 2017-09-18 PROCEDURE — 87205 SMEAR GRAM STAIN: CPT

## 2017-09-18 PROCEDURE — C1894 INTRO/SHEATH, NON-LASER: HCPCS

## 2017-09-18 PROCEDURE — 87102 FUNGUS ISOLATION CULTURE: CPT

## 2017-09-18 PROCEDURE — 87075 CULTR BACTERIA EXCEPT BLOOD: CPT

## 2017-09-18 PROCEDURE — 75989 ABSCESS DRAINAGE UNDER X-RAY: CPT

## 2017-09-18 PROCEDURE — 99156 MOD SED OTH PHYS/QHP 5/>YRS: CPT

## 2017-09-18 PROCEDURE — 99157 MOD SED OTHER PHYS/QHP EA: CPT

## 2018-01-31 ENCOUNTER — APPOINTMENT (OUTPATIENT)
Dept: RADIOLOGY | Facility: MEDICAL CENTER | Age: 68
DRG: 291 | End: 2018-01-31
Attending: EMERGENCY MEDICINE
Payer: OTHER MISCELLANEOUS

## 2018-01-31 ENCOUNTER — RESOLUTE PROFESSIONAL BILLING HOSPITAL PROF FEE (OUTPATIENT)
Dept: HOSPITALIST | Facility: MEDICAL CENTER | Age: 68
End: 2018-01-31
Payer: OTHER MISCELLANEOUS

## 2018-01-31 ENCOUNTER — HOSPITAL ENCOUNTER (INPATIENT)
Facility: MEDICAL CENTER | Age: 68
LOS: 1 days | DRG: 291 | End: 2018-02-01
Attending: EMERGENCY MEDICINE | Admitting: HOSPITALIST
Payer: OTHER MISCELLANEOUS

## 2018-01-31 DIAGNOSIS — I50.9 CONGESTIVE HEART FAILURE, UNSPECIFIED CONGESTIVE HEART FAILURE CHRONICITY, UNSPECIFIED CONGESTIVE HEART FAILURE TYPE: ICD-10-CM

## 2018-01-31 PROBLEM — R07.9 CHEST PAIN: Status: ACTIVE | Noted: 2018-01-31

## 2018-01-31 PROBLEM — J96.00 ACUTE RESPIRATORY FAILURE (HCC): Status: ACTIVE | Noted: 2018-01-31

## 2018-01-31 LAB
ALBUMIN SERPL BCP-MCNC: 3.8 G/DL (ref 3.2–4.9)
ALBUMIN/GLOB SERPL: 1.5 G/DL
ALP SERPL-CCNC: 79 U/L (ref 30–99)
ALT SERPL-CCNC: 9 U/L (ref 2–50)
ANION GAP SERPL CALC-SCNC: 6 MMOL/L (ref 0–11.9)
APTT PPP: 28.5 SEC (ref 24.7–36)
AST SERPL-CCNC: 15 U/L (ref 12–45)
BASOPHILS # BLD AUTO: 0.8 % (ref 0–1.8)
BASOPHILS # BLD: 0.05 K/UL (ref 0–0.12)
BILIRUB SERPL-MCNC: 0.5 MG/DL (ref 0.1–1.5)
BNP SERPL-MCNC: 141 PG/ML (ref 0–100)
BUN SERPL-MCNC: 18 MG/DL (ref 8–22)
CALCIUM SERPL-MCNC: 8.9 MG/DL (ref 8.5–10.5)
CHLORIDE SERPL-SCNC: 101 MMOL/L (ref 96–112)
CO2 SERPL-SCNC: 28 MMOL/L (ref 20–33)
CREAT SERPL-MCNC: 0.86 MG/DL (ref 0.5–1.4)
EKG IMPRESSION: NORMAL
EOSINOPHIL # BLD AUTO: 0.1 K/UL (ref 0–0.51)
EOSINOPHIL NFR BLD: 1.7 % (ref 0–6.9)
ERYTHROCYTE [DISTWIDTH] IN BLOOD BY AUTOMATED COUNT: 47.8 FL (ref 35.9–50)
GLOBULIN SER CALC-MCNC: 2.6 G/DL (ref 1.9–3.5)
GLUCOSE SERPL-MCNC: 111 MG/DL (ref 65–99)
HCT VFR BLD AUTO: 41.7 % (ref 37–47)
HGB BLD-MCNC: 12.9 G/DL (ref 12–16)
IMM GRANULOCYTES # BLD AUTO: 0.01 K/UL (ref 0–0.11)
IMM GRANULOCYTES NFR BLD AUTO: 0.2 % (ref 0–0.9)
INR PPP: 0.99 (ref 0.87–1.13)
LIPASE SERPL-CCNC: 16 U/L (ref 11–82)
LYMPHOCYTES # BLD AUTO: 1.03 K/UL (ref 1–4.8)
LYMPHOCYTES NFR BLD: 17 % (ref 22–41)
MCH RBC QN AUTO: 28.6 PG (ref 27–33)
MCHC RBC AUTO-ENTMCNC: 30.9 G/DL (ref 33.6–35)
MCV RBC AUTO: 92.5 FL (ref 81.4–97.8)
MONOCYTES # BLD AUTO: 0.41 K/UL (ref 0–0.85)
MONOCYTES NFR BLD AUTO: 6.8 % (ref 0–13.4)
NEUTROPHILS # BLD AUTO: 4.46 K/UL (ref 2–7.15)
NEUTROPHILS NFR BLD: 73.5 % (ref 44–72)
NRBC # BLD AUTO: 0 K/UL
NRBC BLD-RTO: 0 /100 WBC
PLATELET # BLD AUTO: 149 K/UL (ref 164–446)
PMV BLD AUTO: 10.9 FL (ref 9–12.9)
POTASSIUM SERPL-SCNC: 4.5 MMOL/L (ref 3.6–5.5)
PROT SERPL-MCNC: 6.4 G/DL (ref 6–8.2)
PROTHROMBIN TIME: 12.8 SEC (ref 12–14.6)
RBC # BLD AUTO: 4.51 M/UL (ref 4.2–5.4)
SODIUM SERPL-SCNC: 135 MMOL/L (ref 135–145)
TROPONIN I SERPL-MCNC: 0.01 NG/ML (ref 0–0.04)
TROPONIN I SERPL-MCNC: <0.01 NG/ML (ref 0–0.04)
WBC # BLD AUTO: 6.1 K/UL (ref 4.8–10.8)

## 2018-01-31 PROCEDURE — 700102 HCHG RX REV CODE 250 W/ 637 OVERRIDE(OP)

## 2018-01-31 PROCEDURE — 93005 ELECTROCARDIOGRAM TRACING: CPT | Performed by: EMERGENCY MEDICINE

## 2018-01-31 PROCEDURE — A9270 NON-COVERED ITEM OR SERVICE: HCPCS

## 2018-01-31 PROCEDURE — 85730 THROMBOPLASTIN TIME PARTIAL: CPT

## 2018-01-31 PROCEDURE — 84484 ASSAY OF TROPONIN QUANT: CPT

## 2018-01-31 PROCEDURE — 99223 1ST HOSP IP/OBS HIGH 75: CPT | Performed by: HOSPITALIST

## 2018-01-31 PROCEDURE — 71045 X-RAY EXAM CHEST 1 VIEW: CPT

## 2018-01-31 PROCEDURE — 83880 ASSAY OF NATRIURETIC PEPTIDE: CPT

## 2018-01-31 PROCEDURE — 85025 COMPLETE CBC W/AUTO DIFF WBC: CPT

## 2018-01-31 PROCEDURE — 700102 HCHG RX REV CODE 250 W/ 637 OVERRIDE(OP): Performed by: HOSPITALIST

## 2018-01-31 PROCEDURE — 83690 ASSAY OF LIPASE: CPT

## 2018-01-31 PROCEDURE — 80053 COMPREHEN METABOLIC PANEL: CPT

## 2018-01-31 PROCEDURE — A9270 NON-COVERED ITEM OR SERVICE: HCPCS | Performed by: HOSPITALIST

## 2018-01-31 PROCEDURE — 770020 HCHG ROOM/CARE - TELE (206)

## 2018-01-31 PROCEDURE — 700111 HCHG RX REV CODE 636 W/ 250 OVERRIDE (IP): Performed by: HOSPITALIST

## 2018-01-31 PROCEDURE — 36415 COLL VENOUS BLD VENIPUNCTURE: CPT

## 2018-01-31 PROCEDURE — 93005 ELECTROCARDIOGRAM TRACING: CPT

## 2018-01-31 PROCEDURE — 85610 PROTHROMBIN TIME: CPT

## 2018-01-31 PROCEDURE — 99285 EMERGENCY DEPT VISIT HI MDM: CPT

## 2018-01-31 RX ORDER — BISACODYL 10 MG
10 SUPPOSITORY, RECTAL RECTAL
Status: DISCONTINUED | OUTPATIENT
Start: 2018-01-31 | End: 2018-02-01 | Stop reason: HOSPADM

## 2018-01-31 RX ORDER — NITROGLYCERIN 0.4 MG/1
0.4 TABLET SUBLINGUAL
Status: DISCONTINUED | OUTPATIENT
Start: 2018-01-31 | End: 2018-02-01 | Stop reason: HOSPADM

## 2018-01-31 RX ORDER — ONDANSETRON 4 MG/1
4 TABLET, ORALLY DISINTEGRATING ORAL EVERY 4 HOURS PRN
Status: DISCONTINUED | OUTPATIENT
Start: 2018-01-31 | End: 2018-02-01 | Stop reason: HOSPADM

## 2018-01-31 RX ORDER — AMOXICILLIN 250 MG
2 CAPSULE ORAL 2 TIMES DAILY
Status: DISCONTINUED | OUTPATIENT
Start: 2018-01-31 | End: 2018-02-01 | Stop reason: HOSPADM

## 2018-01-31 RX ORDER — ACETAMINOPHEN 325 MG/1
650 TABLET ORAL EVERY 6 HOURS PRN
Status: DISCONTINUED | OUTPATIENT
Start: 2018-01-31 | End: 2018-02-01 | Stop reason: HOSPADM

## 2018-01-31 RX ORDER — FUROSEMIDE 10 MG/ML
20 INJECTION INTRAMUSCULAR; INTRAVENOUS
Status: DISCONTINUED | OUTPATIENT
Start: 2018-01-31 | End: 2018-02-01

## 2018-01-31 RX ORDER — ASPIRIN 325 MG
325 TABLET ORAL DAILY
Status: DISCONTINUED | OUTPATIENT
Start: 2018-01-31 | End: 2018-02-01

## 2018-01-31 RX ORDER — LISINOPRIL AND HYDROCHLOROTHIAZIDE 12.5; 1 MG/1; MG/1
1 TABLET ORAL EVERY EVENING
Status: ON HOLD | COMMUNITY
End: 2018-02-01

## 2018-01-31 RX ORDER — LISINOPRIL 20 MG/1
20 TABLET ORAL
Status: DISCONTINUED | OUTPATIENT
Start: 2018-01-31 | End: 2018-02-01 | Stop reason: HOSPADM

## 2018-01-31 RX ORDER — LISINOPRIL AND HYDROCHLOROTHIAZIDE 20; 12.5 MG/1; MG/1
1 TABLET ORAL DAILY
Status: DISCONTINUED | OUTPATIENT
Start: 2018-01-31 | End: 2018-01-31

## 2018-01-31 RX ORDER — ASCORBIC ACID 500 MG
500 TABLET ORAL DAILY
Status: DISCONTINUED | OUTPATIENT
Start: 2018-01-31 | End: 2018-02-01 | Stop reason: HOSPADM

## 2018-01-31 RX ORDER — POLYETHYLENE GLYCOL 3350 17 G/17G
1 POWDER, FOR SOLUTION ORAL
Status: DISCONTINUED | OUTPATIENT
Start: 2018-01-31 | End: 2018-02-01 | Stop reason: HOSPADM

## 2018-01-31 RX ORDER — HYDROCHLOROTHIAZIDE 25 MG/1
12.5 TABLET ORAL
Status: DISCONTINUED | OUTPATIENT
Start: 2018-01-31 | End: 2018-02-01

## 2018-01-31 RX ORDER — MORPHINE SULFATE 4 MG/ML
1 INJECTION, SOLUTION INTRAMUSCULAR; INTRAVENOUS EVERY 4 HOURS PRN
Status: DISCONTINUED | OUTPATIENT
Start: 2018-01-31 | End: 2018-02-01 | Stop reason: HOSPADM

## 2018-01-31 RX ORDER — OXYCODONE AND ACETAMINOPHEN 10; 325 MG/1; MG/1
1-2 TABLET ORAL EVERY 6 HOURS PRN
Status: DISCONTINUED | OUTPATIENT
Start: 2018-01-31 | End: 2018-02-01 | Stop reason: HOSPADM

## 2018-01-31 RX ORDER — ONDANSETRON 2 MG/ML
4 INJECTION INTRAMUSCULAR; INTRAVENOUS EVERY 4 HOURS PRN
Status: DISCONTINUED | OUTPATIENT
Start: 2018-01-31 | End: 2018-02-01 | Stop reason: HOSPADM

## 2018-01-31 RX ADMIN — FUROSEMIDE 20 MG: 10 INJECTION, SOLUTION INTRAMUSCULAR; INTRAVENOUS at 17:40

## 2018-01-31 RX ADMIN — OXYCODONE HYDROCHLORIDE AND ACETAMINOPHEN 2 TABLET: 10; 325 TABLET ORAL at 20:48

## 2018-01-31 RX ADMIN — LISINOPRIL 20 MG: 20 TABLET ORAL at 17:39

## 2018-01-31 RX ADMIN — ASPIRIN 325 MG: 325 TABLET ORAL at 17:39

## 2018-01-31 RX ADMIN — OXYCODONE HYDROCHLORIDE AND ACETAMINOPHEN 500 MG: 500 TABLET ORAL at 17:39

## 2018-01-31 RX ADMIN — ENOXAPARIN SODIUM 40 MG: 100 INJECTION SUBCUTANEOUS at 17:38

## 2018-01-31 RX ADMIN — HYDROCHLOROTHIAZIDE 12.5 MG: 25 TABLET ORAL at 17:39

## 2018-01-31 RX ADMIN — VITAMIN D, TAB 1000IU (100/BT) 2000 UNITS: 25 TAB at 17:39

## 2018-01-31 ASSESSMENT — PATIENT HEALTH QUESTIONNAIRE - PHQ9
1. LITTLE INTEREST OR PLEASURE IN DOING THINGS: NOT AT ALL
SUM OF ALL RESPONSES TO PHQ QUESTIONS 1-9: 0
2. FEELING DOWN, DEPRESSED, IRRITABLE, OR HOPELESS: NOT AT ALL
SUM OF ALL RESPONSES TO PHQ9 QUESTIONS 1 AND 2: 0

## 2018-01-31 ASSESSMENT — COGNITIVE AND FUNCTIONAL STATUS - GENERAL
SUGGESTED CMS G CODE MODIFIER MOBILITY: CH
DAILY ACTIVITIY SCORE: 24
MOBILITY SCORE: 24
SUGGESTED CMS G CODE MODIFIER DAILY ACTIVITY: CH

## 2018-01-31 ASSESSMENT — LIFESTYLE VARIABLES
DO YOU DRINK ALCOHOL: NO
EVER_SMOKED: NEVER

## 2018-01-31 ASSESSMENT — PAIN SCALES - GENERAL
PAINLEVEL_OUTOF10: 3
PAINLEVEL_OUTOF10: 6

## 2018-01-31 NOTE — ED NOTES
Blood drawn and sent to lab.   Patient given urine specimen supplies.   Patient to senior lounge and instructed to inform staff of any needs.

## 2018-01-31 NOTE — ED PROVIDER NOTES
ED Provider Note    Scribed for Carter Min D.O. by Jose Carlos Kim. 1/31/2018  2:15 PM    Primary care provider: YURIDIA Pitts  Means of arrival: walk in  History obtained from: patient  History limited by: none    CHIEF COMPLAINT  Chief Complaint   Patient presents with   • Neck Pain     pressure   • Chest Pain     pressure   • Nausea   • Headache     pressure       HPI  Meghan Mcrae is a 67 y.o. female who presents to the Emergency Department complaining of intermittent chest pain for the last 3 weeks. Patient describes her pain as pressure that is localized in the upper chest, lower neck and does not radiate. She administered aspirin with no relief to her pain. Patient states that movement exacerbates her pain. She reports associated nausea, headache, neck pain. Patient reports a history of MI at 11 years old and 26 years old, history of chronic daily cigarette use. She denies fever, cough, history of diabetes.     Cardiac Risk Factors:  Age > 65  Aspirin use within 7 days  Prior history of coronary artery disease  No diabetes  No hyperlipidemia   No hypertension  No obesity  No family history of coronary artery disease at a young age <54 yo  Tobacco use   No drugs (methamphetamine or cocaine)  No history of aortic aneurysm   No history of aortic dissection   No history of deep vein thrombosis or pulmonary embolism   No hormone replacement  No oral birth control    REVIEW OF SYSTEMS  Pertinent positives include chest pain, nausea, headache, neck pain. Pertinent negatives include no fever, cough.  All other systems reviewed and negative.  C.    PAST MEDICAL HISTORY  Past Medical History:   Diagnosis Date   • Arthritis     scoliosis,    • Breath shortness    • Dental disorder     full mouth   • Heart burn    • Heart murmur    • Hypertension    • Indigestion    • Myocardial infarct     age 25 due to infection   • Pain    • Rheumatic fever     around age 11    • Snoring    • Urinary  incontinence        SURGICAL HISTORY  Past Surgical History:   Procedure Laterality Date   • INCISION AND DRAINAGE GENERAL Left 8/17/2017    Procedure: INCISION AND DRAINAGE GENERAL GROIN WOUND;  Surgeon: Ilene Goff M.D.;  Location: SURGERY [facility];  Service:    • INCISION AND DRAINAGE GENERAL Left 7/20/2017    Procedure: INCISION AND DRAINAGE GENERAL GROIN;  Surgeon: Ilene Goff M.D.;  Location: SURGERY [facility];  Service:    • INGUINAL HERNIA REPAIR Left 5/2/2017    Procedure: INGUINAL HERNIA REPAIR-OPEN;  Surgeon: Ilene Goff M.D.;  Location: SURGERY SAME DAY [facility];  Service:    • SHOULDER ARTHROPLASTY TOTAL REVERSED Left 6/20/2016    Procedure: LEFT REVERSE TOTAL SHOULDER ;  Surgeon: Cal Vera M.D.;  Location: SURGERY [facility];  Service:    • SHOULDER ARTHROPLASTY TOTAL REVERSED  9/19/2013    Performed by Cal Vera M.D. at SURGERY [facility]   • GYN SURGERY      hysterectomy   • KNEE ARTHROPLASTY TOTAL Right ?2010   • OTHER ORTHOPEDIC SURGERY      left hip replacement,right knee replacement   • OTHER ORTHOPEDIC SURGERY      back        SOCIAL HISTORY  Social History   Substance Use Topics   • Smoking status: Current Some Day Smoker     Packs/day: 0.50     Years: 40.00     Types: Cigarettes   • Smokeless tobacco: Never Used   • Alcohol use No      History   Drug Use No       FAMILY HISTORY  Family History   Problem Relation Age of Onset   • Arthritis Mother    • Cancer Mother    • Diabetes Mother    • Heart Failure Mother    • GI Mother    • Heart Attack Mother    • Heart Disease Mother    • Hypertension Mother    • Osteoporosis Mother    • Arthritis Father    • Cancer Father    • Heart Failure Father    • Heart Attack Father    • Heart Disease Father    • Cancer Sister    • Diabetes Sister    • Heart Failure Sister    • Cancer Brother    • Heart Failure Brother    • Cancer Sister    • Cancer Sister        CURRENT MEDICATIONS  Current Outpatient Prescriptions:  "  •  clindamycin (CLEOCIN) 300 MG Cap, Take 1 Cap by mouth 4 times a day., Disp: 28 Cap, Rfl: 1  •  Ascorbic Acid (VITAMIN C PO), Take  by mouth., Disp: , Rfl:   •  Multiple Vitamins-Minerals (MULTIVITAMIN ADULT PO), Take  by mouth., Disp: , Rfl:   •  oxyCODONE CR (OXYCONTIN) 40 MG Tablet Extended Release 12 hour Abuse-Deterrent tablet, Take 40 mg by mouth 3 times a day. Indications: Chronic Pain, Disp: , Rfl:   •  lisinopril-hydrochlorothiazide (PRINZIDE, ZESTORETIC) 20-12.5 MG per tablet, Take 1 Tab by mouth every day., Disp: , Rfl:   •  Cholecalciferol (VITAMIN D) 2000 UNITS Cap, Take  by mouth every day., Disp: , Rfl:   •  gabapentin (NEURONTIN) 300 MG Cap, Take 1 Cap by mouth 3 times a day., Disp: 90 Cap, Rfl: 0  •  Oxycodone-Acetaminophen (PERCOCET-10)  MG TABS, Take 1-2 Tabs by mouth every four hours as needed., Disp: , Rfl:     ALLERGIES  Allergies   Allergen Reactions   • Contrave [Naltrexone-Bupropion Hcl Er] Diarrhea and Unspecified     Shaking, rigidity       PHYSICAL EXAM  VITAL SIGNS: /53   Pulse 93   Temp 37.6 °C (99.7 °F)   Resp 16   Ht 1.753 m (5' 9\")   Wt 105.2 kg (232 lb)   SpO2 92%   BMI 34.26 kg/m²     Nursing notes and vitals reviewed.  Constitutional: Well developed, Well nourished, No acute distress, Non-toxic appearance.   Eyes: PERRLA, EOMI, Conjunctiva normal, No discharge.   Cardiovascular: Normal heart rate, Normal rhythm, Grade 2/6 holosystolic murmurs, No rubs, No gallops.   Thorax & Lungs: No respiratory distress, Rales and rhonchi bilaterally. No wheezing, No chest tenderness.   Abdomen: Bowel sounds normal, Soft, No tenderness, No guarding, No rebound, No masses, No pulsatile masses.   Skin: Warm, Dry, No erythema, No rash. Chronic venous stasis changes to bilateral lower extremities.   Musculoskeletal: Intact distal pulses, No edema, No cyanosis, No clubbing. Good range of motion in all major joints. No tenderness to palpation or major deformities noted, no CVA " tenderness, no midline back tenderness.   Neurologic: Alert & oriented x 3, Normal motor function, Normal sensory function, No focal deficits noted.  Psychiatric: Affect normal for clinical presentation.    DIAGNOSTIC STUDIES/PROCEDURES    LABS  Results for orders placed or performed during the hospital encounter of 01/31/18   Troponin   Result Value Ref Range    Troponin I <0.01 0.00 - 0.04 ng/mL   Btype Natriuretic Peptide   Result Value Ref Range    B Natriuretic Peptide 141 (H) 0 - 100 pg/mL   CBC with Differential   Result Value Ref Range    WBC 6.1 4.8 - 10.8 K/uL    RBC 4.51 4.20 - 5.40 M/uL    Hemoglobin 12.9 12.0 - 16.0 g/dL    Hematocrit 41.7 37.0 - 47.0 %    MCV 92.5 81.4 - 97.8 fL    MCH 28.6 27.0 - 33.0 pg    MCHC 30.9 (L) 33.6 - 35.0 g/dL    RDW 47.8 35.9 - 50.0 fL    Platelet Count 149 (L) 164 - 446 K/uL    MPV 10.9 9.0 - 12.9 fL    Neutrophils-Polys 73.50 (H) 44.00 - 72.00 %    Lymphocytes 17.00 (L) 22.00 - 41.00 %    Monocytes 6.80 0.00 - 13.40 %    Eosinophils 1.70 0.00 - 6.90 %    Basophils 0.80 0.00 - 1.80 %    Immature Granulocytes 0.20 0.00 - 0.90 %    Nucleated RBC 0.00 /100 WBC    Neutrophils (Absolute) 4.46 2.00 - 7.15 K/uL    Lymphs (Absolute) 1.03 1.00 - 4.80 K/uL    Monos (Absolute) 0.41 0.00 - 0.85 K/uL    Eos (Absolute) 0.10 0.00 - 0.51 K/uL    Baso (Absolute) 0.05 0.00 - 0.12 K/uL    Immature Granulocytes (abs) 0.01 0.00 - 0.11 K/uL    NRBC (Absolute) 0.00 K/uL   Complete Metabolic Panel (CMP)   Result Value Ref Range    Sodium 135 135 - 145 mmol/L    Potassium 4.5 3.6 - 5.5 mmol/L    Chloride 101 96 - 112 mmol/L    Co2 28 20 - 33 mmol/L    Anion Gap 6.0 0.0 - 11.9    Glucose 111 (H) 65 - 99 mg/dL    Bun 18 8 - 22 mg/dL    Creatinine 0.86 0.50 - 1.40 mg/dL    Calcium 8.9 8.5 - 10.5 mg/dL    AST(SGOT) 15 12 - 45 U/L    ALT(SGPT) 9 2 - 50 U/L    Alkaline Phosphatase 79 30 - 99 U/L    Total Bilirubin 0.5 0.1 - 1.5 mg/dL    Albumin 3.8 3.2 - 4.9 g/dL    Total Protein 6.4 6.0 - 8.2 g/dL     Globulin 2.6 1.9 - 3.5 g/dL    A-G Ratio 1.5 g/dL   Prothrombin Time   Result Value Ref Range    PT 12.8 12.0 - 14.6 sec    INR 0.99 0.87 - 1.13   APTT   Result Value Ref Range    APTT 28.5 24.7 - 36.0 sec   Lipase   Result Value Ref Range    Lipase 16 11 - 82 U/L   ESTIMATED GFR   Result Value Ref Range    GFR If African American >60 >60 mL/min/1.73 m 2    GFR If Non African American >60 >60 mL/min/1.73 m 2   EKG (ER)   Result Value Ref Range    Report       [facility] Emergency Dept.    Test Date:  2018  Pt Name:    PAMELA SCHULTE                Department: ER  MRN:        [id number]                      Room:  Gender:     Female                       Technician: [id number]  :        1950                   Requested By:ER TRIAGE PROTOCOL  Order #:    [id number]                    Reading MD: LYNDSEY RAMÍREZ DO    Measurements  Intervals                                Axis  Rate:       87                           P:          56  MD:         224                          QRS:        40  QRSD:       80                           T:          35  QT:         340  QTc:        409    Interpretive Statements  SINUS RHYTHM  FIRST DEGREE AV BLOCK  PROBABLE LEFT ATRIAL ABNORMALITY  PROBABLE LEFT VENTRICULAR HYPERTROPHY  Compared to ECG 2017 09:25:43  No significant changes    Electronically Signed On 2018 14:22:03 PST by LYNDSEY RAMÍREZ DO     All labs reviewed by me.    RADIOLOGY  DX-CHEST-LIMITED (1 VIEW)   Final Result      1.  Central vascular congestion and mild interstitial pulmonary edema   2.  Atherosclerosis      The radiologist's interpretation of all radiological studies have been reviewed by me.    COURSE & MEDICAL DECISION MAKING  Pertinent Labs & Imaging studies reviewed. (See chart for details)    2:15 PM - Patient seen and examined at bedside. Ordered DX chest, Estimated GFR, BNP, CBC with differential, CMP, PT/INR, APTT, Lipase to evaluate her symptoms.          This is a [facility] 67 y.o. female that presents with slight upper chest discomfort. The patient is a exertional dyspnea. Here in emergency department her EKG does not reveal ST elevation myocardial infarction nor did she have an elevated troponin. She does have an elevated BNP and x-ray doesn't reveal evidence of slight interstitial edema consistent with heart failure. The patient is not hypoxic, tachypneic or tachycardic and I suspect pulmonary embolism. The patient will be admitted to Dr. Bland for further evaluation and management of her chest discomfort. Upon admission she is hemodynamically stable.   FINAL IMPRESSION  Chest pain  Heart failure     Jose Carlos MOHR (Scribe), am scribing for, and in the presence of, Carter Min D.O    Electronically signed by: Jose Carlos Kim (Scribe), 1/31/2018    ICarter D.O. personally performed the services described in this documentation, as scribed by Jose Carlos Kim in my presence, and it is both accurate and complete.    The note accurately reflects work and decisions made by me.  Carter Min  1/31/2018  3:04 PM

## 2018-01-31 NOTE — ED TRIAGE NOTES
"Pt to triage room with cane    Pt states she has a \"plugged up feeling around her throat\" and her upper chest.    Pt states it happens everytime she stands up and when she walks around. She has also been having headaches. All symptoms have been occurring for 3 weeks.     Triage process explained patient taken to other room for protocols.   Encouraged to report any changes in symptoms to staff      "

## 2018-02-01 ENCOUNTER — PATIENT OUTREACH (OUTPATIENT)
Dept: HEALTH INFORMATION MANAGEMENT | Facility: OTHER | Age: 68
End: 2018-02-01

## 2018-02-01 ENCOUNTER — APPOINTMENT (OUTPATIENT)
Dept: RADIOLOGY | Facility: MEDICAL CENTER | Age: 68
DRG: 291 | End: 2018-02-01
Attending: HOSPITALIST
Payer: OTHER MISCELLANEOUS

## 2018-02-01 VITALS
BODY MASS INDEX: 33.93 KG/M2 | TEMPERATURE: 98.6 F | RESPIRATION RATE: 16 BRPM | SYSTOLIC BLOOD PRESSURE: 151 MMHG | DIASTOLIC BLOOD PRESSURE: 80 MMHG | HEIGHT: 69 IN | OXYGEN SATURATION: 91 % | WEIGHT: 229.06 LBS | HEART RATE: 63 BPM

## 2018-02-01 PROBLEM — J96.00 ACUTE RESPIRATORY FAILURE (HCC): Status: RESOLVED | Noted: 2018-01-31 | Resolved: 2018-02-01

## 2018-02-01 PROBLEM — R07.9 CHEST PAIN: Status: RESOLVED | Noted: 2018-01-31 | Resolved: 2018-02-01

## 2018-02-01 LAB
ANION GAP SERPL CALC-SCNC: 6 MMOL/L (ref 0–11.9)
BUN SERPL-MCNC: 15 MG/DL (ref 8–22)
CALCIUM SERPL-MCNC: 8.7 MG/DL (ref 8.5–10.5)
CHLORIDE SERPL-SCNC: 101 MMOL/L (ref 96–112)
CHOLEST SERPL-MCNC: 149 MG/DL (ref 100–199)
CO2 SERPL-SCNC: 30 MMOL/L (ref 20–33)
CREAT SERPL-MCNC: 0.76 MG/DL (ref 0.5–1.4)
GLUCOSE SERPL-MCNC: 85 MG/DL (ref 65–99)
HDLC SERPL-MCNC: 42 MG/DL
LDLC SERPL CALC-MCNC: 81 MG/DL
LV EJECT FRACT  99904: 60
LV EJECT FRACT MOD 2C 99903: 66.38
LV EJECT FRACT MOD 4C 99902: 68.32
LV EJECT FRACT MOD BP 99901: 66.65
POTASSIUM SERPL-SCNC: 4.1 MMOL/L (ref 3.6–5.5)
SODIUM SERPL-SCNC: 137 MMOL/L (ref 135–145)
TRIGL SERPL-MCNC: 128 MG/DL (ref 0–149)

## 2018-02-01 PROCEDURE — A9270 NON-COVERED ITEM OR SERVICE: HCPCS | Performed by: STUDENT IN AN ORGANIZED HEALTH CARE EDUCATION/TRAINING PROGRAM

## 2018-02-01 PROCEDURE — 700102 HCHG RX REV CODE 250 W/ 637 OVERRIDE(OP): Performed by: STUDENT IN AN ORGANIZED HEALTH CARE EDUCATION/TRAINING PROGRAM

## 2018-02-01 PROCEDURE — 80048 BASIC METABOLIC PNL TOTAL CA: CPT

## 2018-02-01 PROCEDURE — 700102 HCHG RX REV CODE 250 W/ 637 OVERRIDE(OP)

## 2018-02-01 PROCEDURE — 700102 HCHG RX REV CODE 250 W/ 637 OVERRIDE(OP): Performed by: HOSPITALIST

## 2018-02-01 PROCEDURE — 80061 LIPID PANEL: CPT

## 2018-02-01 PROCEDURE — A9270 NON-COVERED ITEM OR SERVICE: HCPCS | Performed by: HOSPITALIST

## 2018-02-01 PROCEDURE — 36415 COLL VENOUS BLD VENIPUNCTURE: CPT

## 2018-02-01 PROCEDURE — 700111 HCHG RX REV CODE 636 W/ 250 OVERRIDE (IP): Performed by: HOSPITALIST

## 2018-02-01 PROCEDURE — A9270 NON-COVERED ITEM OR SERVICE: HCPCS

## 2018-02-01 PROCEDURE — 99239 HOSP IP/OBS DSCHRG MGMT >30: CPT | Performed by: HOSPITALIST

## 2018-02-01 PROCEDURE — 93306 TTE W/DOPPLER COMPLETE: CPT | Mod: 26 | Performed by: INTERNAL MEDICINE

## 2018-02-01 PROCEDURE — 93306 TTE W/DOPPLER COMPLETE: CPT

## 2018-02-01 RX ORDER — ATORVASTATIN CALCIUM 20 MG/1
20 TABLET, FILM COATED ORAL ONCE
Status: COMPLETED | OUTPATIENT
Start: 2018-02-01 | End: 2018-02-01

## 2018-02-01 RX ORDER — ATORVASTATIN CALCIUM 20 MG/1
20 TABLET, FILM COATED ORAL
Status: DISCONTINUED | OUTPATIENT
Start: 2018-02-01 | End: 2018-02-01 | Stop reason: HOSPADM

## 2018-02-01 RX ORDER — FUROSEMIDE 20 MG/1
20 TABLET ORAL DAILY
Qty: 30 TAB | Refills: 0 | Status: SHIPPED | OUTPATIENT
Start: 2018-02-01 | End: 2018-02-01

## 2018-02-01 RX ORDER — ASPIRIN 81 MG/1
81 TABLET, CHEWABLE ORAL DAILY
Status: DISCONTINUED | OUTPATIENT
Start: 2018-02-01 | End: 2018-02-01 | Stop reason: HOSPADM

## 2018-02-01 RX ORDER — LISINOPRIL 20 MG/1
20 TABLET ORAL DAILY
Qty: 30 TAB | Refills: 0 | Status: SHIPPED | OUTPATIENT
Start: 2018-02-02

## 2018-02-01 RX ORDER — OXYCODONE HCL 40 MG/1
40 TABLET, FILM COATED, EXTENDED RELEASE ORAL 3 TIMES DAILY
Status: DISCONTINUED | OUTPATIENT
Start: 2018-02-01 | End: 2018-02-01 | Stop reason: HOSPADM

## 2018-02-01 RX ORDER — FUROSEMIDE 20 MG/1
20 TABLET ORAL DAILY
Qty: 30 TAB | Refills: 0 | Status: SHIPPED | OUTPATIENT
Start: 2018-02-01 | End: 2018-02-08

## 2018-02-01 RX ORDER — ASPIRIN 81 MG/1
81 TABLET, CHEWABLE ORAL DAILY
Qty: 100 TAB | Refills: 0 | Status: SHIPPED | OUTPATIENT
Start: 2018-02-01

## 2018-02-01 RX ORDER — POTASSIUM CHLORIDE 750 MG/1
10 TABLET, EXTENDED RELEASE ORAL DAILY
Qty: 30 EACH | Refills: 0 | Status: SHIPPED | OUTPATIENT
Start: 2018-02-01 | End: 2018-02-08

## 2018-02-01 RX ORDER — POTASSIUM CHLORIDE 20 MEQ/1
10 TABLET, EXTENDED RELEASE ORAL DAILY
Status: DISCONTINUED | OUTPATIENT
Start: 2018-02-01 | End: 2018-02-01 | Stop reason: HOSPADM

## 2018-02-01 RX ORDER — FUROSEMIDE 20 MG/1
20 TABLET ORAL
Status: DISCONTINUED | OUTPATIENT
Start: 2018-02-01 | End: 2018-02-01 | Stop reason: HOSPADM

## 2018-02-01 RX ORDER — ATORVASTATIN CALCIUM 20 MG/1
20 TABLET, FILM COATED ORAL
Qty: 30 TAB | Refills: 0 | Status: SHIPPED | OUTPATIENT
Start: 2018-02-01

## 2018-02-01 RX ADMIN — FUROSEMIDE 20 MG: 10 INJECTION, SOLUTION INTRAMUSCULAR; INTRAVENOUS at 06:00

## 2018-02-01 RX ADMIN — OXYCODONE HYDROCHLORIDE AND ACETAMINOPHEN 2 TABLET: 10; 325 TABLET ORAL at 03:58

## 2018-02-01 RX ADMIN — OXYCODONE HYDROCHLORIDE AND ACETAMINOPHEN 500 MG: 500 TABLET ORAL at 08:18

## 2018-02-01 RX ADMIN — OXYCODONE HYDROCHLORIDE 40 MG: 40 TABLET, FILM COATED, EXTENDED RELEASE ORAL at 15:45

## 2018-02-01 RX ADMIN — POTASSIUM CHLORIDE 10 MEQ: 1500 TABLET, EXTENDED RELEASE ORAL at 17:05

## 2018-02-01 RX ADMIN — HYDROCHLOROTHIAZIDE 12.5 MG: 25 TABLET ORAL at 08:19

## 2018-02-01 RX ADMIN — ATORVASTATIN CALCIUM 20 MG: 20 TABLET, FILM COATED ORAL at 17:05

## 2018-02-01 RX ADMIN — ASPIRIN 325 MG: 325 TABLET ORAL at 08:18

## 2018-02-01 RX ADMIN — VITAMIN D, TAB 1000IU (100/BT) 2000 UNITS: 25 TAB at 08:18

## 2018-02-01 RX ADMIN — ENOXAPARIN SODIUM 40 MG: 100 INJECTION SUBCUTANEOUS at 08:19

## 2018-02-01 RX ADMIN — METOPROLOL TARTRATE 25 MG: 25 TABLET, FILM COATED ORAL at 17:05

## 2018-02-01 RX ADMIN — LISINOPRIL 20 MG: 20 TABLET ORAL at 08:19

## 2018-02-01 RX ADMIN — OXYCODONE HYDROCHLORIDE 40 MG: 40 TABLET, FILM COATED, EXTENDED RELEASE ORAL at 10:04

## 2018-02-01 ASSESSMENT — PAIN SCALES - GENERAL
PAINLEVEL_OUTOF10: 1
PAINLEVEL_OUTOF10: 2
PAINLEVEL_OUTOF10: 0

## 2018-02-01 ASSESSMENT — ENCOUNTER SYMPTOMS
VOMITING: 0
NAUSEA: 0
CHILLS: 0
DEPRESSION: 0
DOUBLE VISION: 0
PALPITATIONS: 0
MYALGIAS: 0
COUGH: 0
FEVER: 0
HEADACHES: 0

## 2018-02-01 ASSESSMENT — LIFESTYLE VARIABLES: EVER_SMOKED: NEVER

## 2018-02-01 NOTE — DISCHARGE PLANNING
Contacted [facility] spoke to Wandy they service DANUTA Keen. Notified NITESH Willett.

## 2018-02-01 NOTE — H&P
DATE OF ADMISSION:  01/31/2018    PRIMARY CARE PHYSICIAN:  Aurora Garcia.    CHIEF COMPLAINT:  Upper chest, neck pressure.    HISTORY OF PRESENT ILLNESS:  Patient is a 67-year-old female with history of   hypertension, COPD, evaluated here at [facility] with complaints of upper chest,   neck pressure, pain sensation.  Onset of symptoms 3 weeks ago.  She reports   having nausea, no vomiting.  Occasionally occurs with standing and exertion   and decreased after rest and at times when she takes aspirin.  Reports no   chest pain or shortness of breath.  No fevers, chills or cough or difficulty   swallowing.  No abdominal pain, melena, or hematochezia.    REVIEW OF SYSTEMS:  As above, otherwise negative according to AMA and CMS   criteria.    PAST MEDICAL HISTORY:  Includes COPD, not O2 dependent; hypertension; left   groin abscess incision and drainage; chronic pain, shoulder, back.    PAST SURGICAL HISTORY:  In addition to above includes right total knee   replacement, left hip replacement x2, bilateral shoulder replacement.    MEDICATIONS:  Include vitamin C daily, vitamin D 2000 units daily,   lisinopril/hydrochlorothiazide 20/12.5 daily, multivitamin daily, OxyContin 40   mg every 12 hours, Percocet 10/325 every 4 hours as needed.    ALLERGIES:  INCLUDE CONTRAVE.    SOCIAL HISTORY:  Quit tobacco use 1 month ago after smoking for about 40   years.  No alcohol.  Lives with her .    FAMILY HISTORY:  Heart disease in mother and father, unspecified.    PHYSICAL EXAMINATION:  VITAL SIGNS:  Current vitals reveals a temperature of 37.6, pulse 70s-60s,   respirations 14, 84% on room air, 95% on 2 liters, blood pressure 134/53 to   160/80, 105 kilograms.  GENERAL:  Patient was alert, appropriate, oriented.  No apparent distress.    Severely obese.  HEENT:  Anicteric.  Extraocular movements are intact.  Mucous membranes were   moist.  NECK:  No cervical or supraclavicular adenopathy.  Trachea midline. No   appreciable  JVD.  CARDIOVASCULAR:  Regular rate and rhythm with II/VI systolic ejection murmur.  LUNGS:  Revealed mild bilateral inspiratory rales at the bases.  No wheeze.    Normal chest wall excursion effort.  ABDOMEN:  Bowel sounds present, soft, nontender, nondistended.  No   hepatosplenomegaly.  No pulsatile masses.  Obese.  BACK:  No CVA or paraspinal tenderness.  EXTREMITIES:  No lower extremity edema.  Negative Homans sign, symmetric,   generalized 5/5 strength, 2+ symmetric radial pulses.  SKIN:  Warm and dry without pallor.  PSYCHIATRIC:  Calm, cooperative without depressed affect.    LABORATORY DATA:  Patient's labs reveal white count 6, hemoglobin 12.9,   platelet count 149,000.  BUN and creatinine 18 and 0.8, blood sugar 111,   troponin less than 0.01.  BNP of 141.    INR 0.99.    A chest x-ray revealed central vascular congestion, mild interstitial   pulmonary edema, atherosclerotic changes.    EKG revealed sinus rhythm, rate approximately 87 with Q-wave in anterior   septal lead V1, V2.    IMPRESSION AND PLAN:  1.  Acute hypoxic respiratory failure.  Patient will be admitted acute to   telemetry floor.  Possible attributed to underlying congestive heart failure,   chronic obstructive pulmonary disease component.  Patient will be started on   Lasix diuresis, placed on bronchodilator therapy, O2 protocols, wean FIO2 as   tolerated.  2.  New onset congestive heart failure despite BNP only slightly elevated.    She has no fever, cough to suggest pneumonia/infectious etiology.  Will be   started on IV Lasix diuresis.  Follow up urine output.  Check echocardiogram   to evaluate left ventricular function.  3.  Upper chest, neck pain/pressure, intermittent.  She has no association   with eating or other gastrointestinal associated symptoms.  Reports occurring   with exertion at times.  With her cardiac disease risk factors, we will have   serial troponins, start aspirin therapy, evaluate for ischemic heart disease    with stress test.  May need further workup depending on findings, if recurrent   symptoms.  4.  Chronic obstructive pulmonary disease without exacerbation.  We will place   on bronchodilator therapy, RT protocols.  5.  History of hypertension, currently uncontrolled.  Resume outpatient   medications including her ACE inhibitor/hydrochlorothiazide.  Add p.r.n.   hydralazine.  6.  Chronic neck and shoulder pain.  Resume outpatient medications.  7.  Observation admission, less than 2 midnights stay anticipated.       ____________________________________     DAMIEN NICE MD    QBV / NTS    DD:  01/31/2018 23:21:58  DT:  02/01/2018 00:21:15    D#:  [id number]  Job#:  [id number]

## 2018-02-01 NOTE — FACE TO FACE
Face to Face Note  -  Durable Medical Equipment    Georgette Covarrubias M.D. - NPI: [id number]  I certify that this patient is under my care and that they had a durable medical equipment(DME)face to face encounter by myself that meets the physician DME face-to-face encounter requirements with this patient on:    Date of encounter:   Patient:                    MRN:                       YOB: 2018  Meghan Mcrae  [id number]  1950     The encounter with the patient was in whole, or in part, for the following medical condition, which is the primary reason for durable medical equipment:  CHF    I certify that, based on my findings, the following durable medical equipment is medically necessary:  Oxygen.    HOME O2 Saturation Measurements:(Values must be present for Home Oxygen orders)  Room air sat at rest: 88  Room air sat with amb: 84  With liters of O2: 2, O2 sat at rest with O2: 92  With Liters of O2: 3.5, O2 sat with amb with O2 : 91  Is the patient mobile?: Yes    My Clinical findings support the need for the above equipment due to:  Hypoxia

## 2018-02-01 NOTE — PROGRESS NOTES
Pt VSS, afebrile.  Medicated for pain per orders.  Bed in low position, call light in reach, nonskid socks on.  Will continue to monitor.

## 2018-02-01 NOTE — PROGRESS NOTES
Heart failure packet reviewed with patient and . Both actively participated in teaching and verbalized understanding of all teaching points including, but not limited to, low sodium diet, daily weights, medication administration, follow up appointments, symptom assessment, and when to call the doctor. Patient and  verified learning via teach back.

## 2018-02-01 NOTE — DISCHARGE PLANNING
Medical Social Worker    SARITHA received physical address:  [address] [place] [id number].    SARITHA faxed over choice form for O2 at [facility] [place], [address], [phone] to [facility].    Plan:  Saritha will continue to assist with pt's needs.

## 2018-02-01 NOTE — CONSULTS
"Cardiology consult    Name Meghan Mcrae 1950   Age/Sex 67 y.o. female   MRN [id number]         Cardiology consult requested by Dr. Covarrubias for new heart failure    HPI:  Ms Meghan Mcrae is a very pleasant 67-year-old woman who presented with neck pain, onset \"a few days ago\" present only with exertion or when she stands up. The pain is on both sides of her neck. She has never had pain like this before. She denies associated arm pain, dyspnea, nausea or vomiting. First episode occurred while she was doing the dishes and was completely relieved by rest. Subsequent episodes occurred upon standing. She states she has a history of rheumatic fever and hypertension and has been on lisinopril for the hypertension. She has no history of MI but states mom, dad and brother had heart attacks as early as age 42 in her brother. She quit smoking one month ago after 40 pack years.     Review of Systems   Constitutional: Negative for chills and fever.   Eyes: Negative for double vision.   Respiratory: Negative for cough.    Cardiovascular: Negative for chest pain and palpitations.   Gastrointestinal: Negative for nausea and vomiting.   Genitourinary: Negative for dysuria.   Musculoskeletal: Negative for myalgias.   Skin: Negative for rash.   Neurological: Negative for headaches.   Psychiatric/Behavioral: Negative for depression.     Past Medical History:   Past Medical History:   Diagnosis Date   • Arthritis     scoliosis,    • Breath shortness    • Dental disorder     full mouth   • Heart burn    • Heart murmur    • Hypertension    • Indigestion    • Myocardial infarct     age 25 due to infection   • Pain    • Rheumatic fever     around age 11    • Snoring    • Urinary incontinence        Past Surgical History:  Past Surgical History:   Procedure Laterality Date   • INCISION AND DRAINAGE GENERAL Left 2017    Procedure: INCISION AND DRAINAGE GENERAL GROIN WOUND;  Surgeon: Ilene Goff M.D.;  Location: " SURGERY [facility];  Service:    • INCISION AND DRAINAGE GENERAL Left 7/20/2017    Procedure: INCISION AND DRAINAGE GENERAL GROIN;  Surgeon: Ilene Goff M.D.;  Location: SURGERY [facility];  Service:    • INGUINAL HERNIA REPAIR Left 5/2/2017    Procedure: INGUINAL HERNIA REPAIR-OPEN;  Surgeon: Ilene Goff M.D.;  Location: SURGERY SAME DAY [facility];  Service:    • SHOULDER ARTHROPLASTY TOTAL REVERSED Left 6/20/2016    Procedure: LEFT REVERSE TOTAL SHOULDER ;  Surgeon: Cal Vera M.D.;  Location: SURGERY [facility];  Service:    • SHOULDER ARTHROPLASTY TOTAL REVERSED  9/19/2013    Performed by aCl Vera M.D. at [facility]   • GYN SURGERY      hysterectomy   • KNEE ARTHROPLASTY TOTAL Right ?2010   • OTHER ORTHOPEDIC SURGERY      left hip replacement,right knee replacement   • OTHER ORTHOPEDIC SURGERY      back       Current Outpatient Medications:  Home Medications     Reviewed by Theresa Deleon R.N. (Registered Nurse) on 01/31/18 at 1700  Med List Status: Complete   Medication Last Dose Status   Ascorbic Acid (VITAMIN C PO) 1/30/2018 Active   Cholecalciferol (VITAMIN D) 2000 UNITS Cap 1/30/2018 Active   lisinopril-hydrochlorothiazide (PRINZIDE, ZESTORETIC) 10-12.5 MG per tablet 1/30/2018 Active   lisinopril-hydrochlorothiazide (PRINZIDE, ZESTORETIC) 20-12.5 MG per tablet 1/30/2018 Active   Multiple Vitamins-Minerals (MULTIVITAMIN ADULT PO) 1/30/2018 Active   oxyCODONE CR (OXYCONTIN) 40 MG Tablet Extended Release 12 hour Abuse-Deterrent tablet 1/31/2018 Active   Oxycodone-Acetaminophen (PERCOCET-10)  MG TABS 1/31/2018 Active                Medication Allergy/Sensitivities:  Allergies   Allergen Reactions   • Contrave [Naltrexone-Bupropion Hcl Er] Diarrhea and Unspecified     Shaking, rigidity         Family History:  Family History   Problem Relation Age of Onset   • Arthritis Mother    • Cancer Mother    • Diabetes Mother    • Heart Failure Mother    • GI Mother   "  • Heart Attack Mother    • Heart Disease Mother    • Hypertension Mother    • Osteoporosis Mother    • Arthritis Father    • Cancer Father    • Heart Failure Father    • Heart Attack Father    • Heart Disease Father    • Cancer Sister    • Diabetes Sister    • Heart Failure Sister    • Cancer Brother    • Heart Failure Brother    • Cancer Sister    • Cancer Sister        Social History:  Social History     Social History   • Marital status:      Spouse name: N/A   • Number of children: N/A   • Years of education: N/A     Occupational History   • Not on file.     Social History Main Topics   • Smoking status: Current Some Day Smoker     Packs/day: 0.50     Years: 40.00     Types: Cigarettes   • Smokeless tobacco: Never Used   • Alcohol use No   • Drug use: No   • Sexual activity: No     Other Topics Concern   • Not on file     Social History Narrative   • No narrative on file     Physical Exam     Vitals:    01/31/18 2334 02/01/18 0321 02/01/18 0728 02/01/18 1117   BP: 124/56 131/68 134/95 123/74   Pulse: 62 64 64 65   Resp: 18 18 16 16   Temp: 36.6 °C (97.8 °F) 36.4 °C (97.5 °F) 36.8 °C (98.3 °F) 37.1 °C (98.7 °F)   SpO2: 93% 95% 90% 96%   Weight:       Height:         Body mass index is 34.84 kg/m².  /74   Pulse 65   Temp 37.1 °C (98.7 °F)   Resp 16   Ht 1.753 m (5' 9\")   Wt 107 kg (235 lb 14.3 oz)   SpO2 96%   Breastfeeding? No   BMI 34.84 kg/m²   O2 therapy: Pulse Oximetry: 96 %, O2 (LPM): 2, O2 Delivery: Silicone Nasal Cannula    Physical Exam   Constitutional: She is oriented to person, place, and time and well-developed, well-nourished, and in no distress. No distress.   HENT:   Head: Normocephalic and atraumatic.   Eyes: Pupils are equal, round, and reactive to light.   Neck: No JVD present.   Cardiovascular: Normal rate and regular rhythm.    No murmur heard.  Pulmonary/Chest: No respiratory distress.   Neurological: She is alert and oriented to person, place, and time.   Skin: She is " not diaphoretic.       Data Review     Lab Data Review:  Recent Results (from the past 24 hour(s))   TROPONIN    Collection Time: 01/31/18  9:44 PM   Result Value Ref Range    Troponin I 0.01 0.00 - 0.04 ng/mL   Lipid Profile (Lipid Panel) Fasting    Collection Time: 02/01/18  2:14 AM   Result Value Ref Range    Cholesterol,Tot 149 100 - 199 mg/dL    Triglycerides 128 0 - 149 mg/dL    HDL 42 >=40 mg/dL    LDL 81 <100 mg/dL   BASIC METABOLIC PANEL    Collection Time: 02/01/18  2:14 AM   Result Value Ref Range    Sodium 137 135 - 145 mmol/L    Potassium 4.1 3.6 - 5.5 mmol/L    Chloride 101 96 - 112 mmol/L    Co2 30 20 - 33 mmol/L    Glucose 85 65 - 99 mg/dL    Bun 15 8 - 22 mg/dL    Creatinine 0.76 0.50 - 1.40 mg/dL    Calcium 8.7 8.5 - 10.5 mg/dL    Anion Gap 6.0 0.0 - 11.9   ESTIMATED GFR    Collection Time: 02/01/18  2:14 AM   Result Value Ref Range    GFR If African American >60 >60 mL/min/1.73 m 2    GFR If Non African American >60 >60 mL/min/1.73 m 2   ECHOCARDIOGRAM COMP W/O CONT    Collection Time: 02/01/18  9:27 AM   Result Value Ref Range    Eject.Frac. MOD BP 66.65     Eject.Frac. MOD 4C 68.32     Eject.Frac. MOD 2C 66.38     Left Ventrical Ejection Fraction 60           Impression  1. Neck pain  2. Heart failure with preserved EF  3. Essential HTN  4. History of rheumatic fever  5. Moderate AS  6. Mild MV stenosis  7. Pulmonary Hypertension  8. Tobacco abuse    This is a 67 year old woman with heart failure with preserved ejection fraction secondary to longstanding systemic hypertension causing left ventricular hypertrophy. Rheumatic fever contributing to valvular disease and should be followed by cardiologist as outpatient as well. Breathing and oxygenation improved now she is at her dry weight. She also describes atypical chest pain, intermittently exertional, located in the neck. Stress test on outpatient basis is reasonable. Presently she is asymptomatic, walking halls without dyspnea, chest pain,  palpitations or dizziness.     Plan  - Start metoprolol 25mg twice daily  - Continue lisinopril 20mg daily  - Daily baby aspirin  - Lasix 20mg PO daily  - Potassium 8mEq daily  - Discontinue HCTZ  - Outpatient cardiology follow up   - Daily weights, low salt, fluid restrict.       Quality Measures    Reviewed items::  Labs reviewed and Medications reviewed

## 2018-02-01 NOTE — DISCHARGE PLANNING
Received choice form from NITESH Willett for HH. Notified [facility] that we will need HH order and F2F before the referral can be sent.

## 2018-02-01 NOTE — DISCHARGE PLANNING
Medical Social Worker    Bedside nurse communicated with pt's need for HH choice.  SW met with pt and reports she lives in [place], [place] and chose [facility].  N[facility] faxed choice form to [facility].  Pt reviewed, signed and IMM.  SW provided copy of IMM.    Plan:  NIT[facility] will continue to assist with pt's needs.

## 2018-02-01 NOTE — PROGRESS NOTES
Bedside report received. Pt sitting on side of bed in no apparent distress. VSS and no acute events over NOC. Pt denies needs at this time. Universal fall precautions in place. Patient care assumed.

## 2018-02-01 NOTE — DISCHARGE PLANNING
Received choice form from [facility]lett for O2. Referral sent to [facility] at 1225. Per NITESH HH not needed.

## 2018-02-01 NOTE — PROGRESS NOTES
2 RN skin check performed with Nancy HAYES upon admission from ER . Skin intact other than blanchable redness in abdominal fold and BLE dryness. No open areas.

## 2018-02-01 NOTE — DISCHARGE PLANNING
Care Transition Team Assessment    SW met with pt and pt's  at bedside.  Pt reports she lives in a one-story house.  Pt signed choice on  for [facility].  Pt reports she has a cane, walker and wheelchair.  Pt's preferred pharmacy is:  [facility] [address] [place] [id number], [phone].  Pt reports her spouse is her support system.  Pt reports her eagerness to d/c from hospital.  Pt reports she has no other needs at this time.  Pt signed IMM and was provided a copy.      Information Source  Orientation : Oriented x 4  Information Given By: Patient  Who is responsible for making decisions for patient? : Patient         Elopement Risk  Legal Hold: No  Ambulatory or Self Mobile in Wheelchair: Yes  Disoriented: No  Psychiatric Symptoms: None  History of Wandering: No  Elopement this Admit: No  Vocalizing Wanting to Leave: No  Displays Behaviors, Body Language Wanting to Leave: No-Not at Risk for Elopement  Elopement Risk: Not at Risk for Elopement    Interdisciplinary Discharge Planning  Does Admitting Nurse Feel This Could be a Complex Discharge?: No  Primary Care Physician: Aurora Garcia  Lives with - Patient's Self Care Capacity: Spouse  Patient or legal guardian wants to designate a caregiver (see row info): No  Support Systems: Family Member(s), Friends / Neighbors  Housing / Facility: 1 Story House  Do You Take your Prescribed Medications Regularly: Yes  Able to Return to Previous ADL's: Yes  Mobility Issues: No  Prior Services: None  Patient Expects to be Discharged to:: home  Assistance Needed: No  Durable Medical Equipment: Walker (electric WC)    Discharge Preparedness  What is your plan after discharge?: Home with help  What are your discharge supports?: Spouse  Prior Functional Level: Independent with Activities of Daily Living, Independent with Medication Management, Uses Cane, Uses Walker, Uses Wheelchair    Functional Assesment  Prior Functional Level: Independent with  Activities of Daily Living, Independent with Medication Management, Uses Cane, Uses Walker, Uses Wheelchair    Finances  Financial Barriers to Discharge: No    Vision / Hearing Impairment  Vision Impairment : Yes  Right Eye Vision: Wears Glasses  Left Eye Vision: Wears Glasses  Hearing Impairment : Yes  Hearing Impairment: Patient Declines to Wear Hearing Device(s)  Does Pt Need Special Equipment for the Hearing Impaired?: No    Values / Beliefs / Concerns  Values / Beliefs Concerns : No  Special Hospitalization Concerns: no         Domestic Abuse  Have you ever been the victim of abuse or violence?: No  Physical Abuse or Sexual Abuse: No  Verbal Abuse or Emotional Abuse: No  Possible Abuse Reported to:: Not Applicable    Psychological Assessment  History of Substance Abuse: None    Discharge Risks or Barriers  Discharge risks or barriers?: No    Anticipated Discharge Information  Anticipated discharge disposition: [facility], Home

## 2018-02-02 ENCOUNTER — PATIENT OUTREACH (OUTPATIENT)
Dept: HEALTH INFORMATION MANAGEMENT | Facility: OTHER | Age: 68
End: 2018-02-02

## 2018-02-02 NOTE — DISCHARGE INSTRUCTIONS
Discharge Instructions    Discharged to home by car with relative. Discharged via wheelchair, hospital escort: Yes.  Special equipment needed: Oxygen    Be sure to schedule a follow-up appointment with your primary care doctor or any specialists as instructed.     Discharge Plan:   Diet Plan: Discussed  Activity Level: Discussed  Confirmed Follow up Appointment: Appointment Scheduled  Confirmed Symptoms Management: Discussed  Medication Reconciliation Updated: Yes  Influenza Vaccine Indication: Patient Refuses    I understand that a diet low in cholesterol, fat, and sodium is recommended for good health. Unless I have been given specific instructions below for another diet, I accept this instruction as my diet prescription.   Other diet: low sodium    Special Instructions:   HF Patient Discharge Instructions  · Monitor your weight daily, and maintain a weight chart, to track your weight changes.   · Activity as tolerated, unless your Doctor has ordered otherwise. Other activity order: as tolerated.  · Follow a low fat, low cholesterol, low salt diet unless instructed otherwise by your Doctor. Read the labels on the back of food products and track your intake of fat, cholesterol and salt.   · Fluid Restriction No. If a Fluid Restriction has been ordered by your Doctor, measure fluids with a measuring cup to ensure that you are not exceeding the restriction.   · No smoking.  · Oxygen Yes. If your Doctor has ordered that you wear Oxygen at home, it is important to wear it as ordered.  · Did you receive an explanation from staff on the importance of taking each of your medications and why it is necessary to keep taking them unless your doctor says to stop? Yes  · Were all of your questions answered about how to manage your heart failure and what to do if you have increased signs and symptoms after you go home? Yes  · Do you feel like your heart failure care team involved you in the care treatment plan and allowed you to  make decisions regarding your care while in the hospital and addressed any discharge needs you might have? Yes    See the educational handout provided at discharge for more information on monitoring your daily weight, activity and diet. This also explains more about Heart Failure, symptoms of a flare-up and some of the tests that you have undergone.     Warning Signs of a Flare-Up include:  · Swelling in the ankles or lower legs.  · Shortness of breath, while at rest, or while doing normal activities.   · Shortness of breath at night when in bed, or coughing in bed.   · Requiring more pillows to sleep at night, or needing to sit up at night to sleep.  · Feeling weak, dizzy or fatigued.     When to call your Doctor:  · Call [facility] seven days a week from 8:00 a.m. to 8:00 p.m. for medical questions [phone].  · Call your Primary Care Physician or Cardiologist if:   1. You experience any pain radiating to your jaw or neck.  2. You have any difficulty breathing.  3. You experience weight gain of 3 lbs in a day or 5 lbs in a week.   4. You feel any palpitations or irregular heartbeats.  5. You become dizzy or lose consciousness.   If you have had an angiogram or had a pacemaker or AICD placed, and experience:  1. Bleeding, drainage or swelling at the surgical / puncture site.  2. Fever greater than 100.0 F  3. Shock from internal defibrillator.  4. Cool and / or numb extremities.      · Is patient discharged on Warfarin / Coumadin?   No     Depression / Suicide Risk    As you are discharged from this [facility], it is important to learn how to keep safe from harming yourself.    Recognize the warning signs:  · Abrupt changes in personality, positive or negative- including increase in energy   · Giving away possessions  · Change in eating patterns- significant weight changes-  positive or negative  · Change in sleeping patterns- unable to sleep or sleeping all the time   · Unwillingness or  inability to communicate  · Depression  · Unusual sadness, discouragement and loneliness  · Talk of wanting to die  · Neglect of personal appearance   · Rebelliousness- reckless behavior  · Withdrawal from people/activities they love  · Confusion- inability to concentrate     If you or a loved one observes any of these behaviors or has concerns about self-harm, here's what you can do:  · Talk about it- your feelings and reasons for harming yourself  · Remove any means that you might use to hurt yourself (examples: pills, rope, extension cords, firearm)  · Get professional help from the community (Mental Health, Substance Abuse, psychological counseling)  · Do not be alone:Call your Safe Contact- someone whom you trust who will be there for you.  · Call your local CRISIS HOTLINE 743-7933 or 894-393-8137  · Call your local Children's Mobile Crisis Response Team [place] (083) 075-8163 or www.[employer]  · Call the toll free National Suicide Prevention Hotlines   · National Suicide Prevention Lifeline 430-703-FQMD (5003)  · National Hope Line Network 800-SUICIDE (174-1958)

## 2018-02-02 NOTE — PROGRESS NOTES
Discharge instructions reviewed with patient and . Iv d/c and tele box returned to desk. Pt verbalized understanding of all instructions and states will  prescriptions in am as pharmacy will be closed by the time they get home. MD aware and evening dose of metoprolol and liptor given per MD order prior to d/c. Pt escorted out in wheelchair.

## 2018-02-02 NOTE — DISCHARGE SUMMARY
CHIEF COMPLAINT ON ADMISSION  Chief Complaint   Patient presents with   • Neck Pain     pressure   • Chest Pain     pressure   • Nausea   • Headache     pressure       CODE STATUS  Full Code    HPI & HOSPITAL COURSE  This is a 67 y.o. female here with SOB and chest pressure. Her chest XR showed central vascular congestion and interstitial edema. On examination she had mild JVD LE pitting edema. She was given lasix and echocardiogram performed which showed diastolic HR with preserved EF and moderate aortic stenosis. Cardiology was consulted for the new diagnosis and her medications were optimized. LE edema improved and SOB with chest pressure resolved. She was still requiring oxygen and unfortunately desaturated on room air so she was discharged with new home O2 and lasix 20mg PO daily. She was very eager to go home to continue her recovery and actually declined stress test 2/2 fear of headache (from one performed in early 2000's) despite counseling. She remained hemodynamically stable.    The patient recovered much more quickly than anticipated on admission.    Therefore, she is discharged in good and stable condition with close outpatient follow-up.    SPECIFIC OUTPATIENT FOLLOW-UP  Follow up as noted below.  No pending cultures at the time of discharge.    DISCHARGE PROBLEM LIST  Active Problems:    * No active hospital problems. *  Resolved Problems:    Acute respiratory failure (CMS-[facility]) POA: Yes    Chest pain POA: Yes  Acute diastolic heart failure: Yes    FOLLOW UP  Future Appointments  Date Time Provider Department Center   2/23/2018 10:30 AM Villa Griffith M.D. [facility] None     UYRIDIA Pitts  [address] [facility] [address]  [phone]    Go on 2/7/2018  Please arrive at 3:30pm for your appointment. Thank you      MEDICATIONS ON DISCHARGE   Meghan Mcrae   Home Medication Instructions CHELLE:[id number]    Printed on:02/01/18 1621   Medication Information                      Ascorbic Acid  (VITAMIN C PO)  Take 1 Tab by mouth every day.             aspirin (ASA) 81 MG Chew Tab chewable tablet  Take 1 Tab by mouth every day.             atorvastatin (LIPITOR) 20 MG Tab  Take 1 Tab by mouth every bedtime.             Cholecalciferol (VITAMIN D) 2000 UNITS Cap  Take 1 Cap by mouth every day.             furosemide (LASIX) 20 MG Tab  Take 1 Tab by mouth every day.             lisinopril (PRINIVIL) 20 MG Tab  Take 1 Tab by mouth every day.             metoprolol (LOPRESSOR) 25 MG Tab  Take 1 Tab by mouth 2 Times a Day.             Multiple Vitamins-Minerals (MULTIVITAMIN ADULT PO)  Take 1 Tab by mouth every day.             oxyCODONE CR (OXYCONTIN) 40 MG Tablet Extended Release 12 hour Abuse-Deterrent tablet  Take 40 mg by mouth 3 times a day. Indications: Chronic Pain             Oxycodone-Acetaminophen (PERCOCET-10)  MG TABS  Take 1-2 Tabs by mouth every four hours as needed (For breakthrough pain).             potassium chloride SA (K-DUR) 10 MEQ Tab CR  Take 1 Tab by mouth every day for 30 days.                 DIET  Orders Placed This Encounter   Procedures   • Diet Order     Standing Status:   Standing     Number of Occurrences:   1     Order Specific Question:   Diet:     Answer:   Cardiac [6]       ACTIVITY  As tolerated.  Weight bearing as tolerated      CONSULTATIONS  Cardiology    PROCEDURES  CXR-  1.  Central vascular congestion and mild interstitial pulmonary edema  2.  Atherosclerosis    Echo-  Mild concentric left ventricular hypertrophy.  Normal left ventricular systolic function. Left ventricular ejection   fraction is visually estimated to be 60%.  Grade I diastolic dysfunction.  Enlarged right atrium.  Dilated inferior vena cava with inspiratory collapse.  Moderately dilated left atrium.  Moderate aortic stenosis. Transvalvular gradients are - Peak:47  mmHg,   Mean: 26 mmHg.  Right ventricular systolic pressure is estimated to be 53 mmHg.    LABORATORY  Lab Results   Component Value  Date/Time    SODIUM 137 02/01/2018 02:14 AM    POTASSIUM 4.1 02/01/2018 02:14 AM    CHLORIDE 101 02/01/2018 02:14 AM    CO2 30 02/01/2018 02:14 AM    GLUCOSE 85 02/01/2018 02:14 AM    BUN 15 02/01/2018 02:14 AM    CREATININE 0.76 02/01/2018 02:14 AM        Lab Results   Component Value Date/Time    WBC 6.1 01/31/2018 12:27 PM    HEMOGLOBIN 12.9 01/31/2018 12:27 PM    HEMATOCRIT 41.7 01/31/2018 12:27 PM    PLATELETCT 149 (L) 01/31/2018 12:27 PM        Total time of the discharge process exceeds 38 minutes

## 2018-02-08 ENCOUNTER — OFFICE VISIT (OUTPATIENT)
Dept: CARDIOLOGY | Facility: MEDICAL CENTER | Age: 68
End: 2018-02-08
Payer: OTHER MISCELLANEOUS

## 2018-02-08 VITALS
OXYGEN SATURATION: 81 % | HEART RATE: 74 BPM | SYSTOLIC BLOOD PRESSURE: 130 MMHG | BODY MASS INDEX: 34.51 KG/M2 | WEIGHT: 233 LBS | DIASTOLIC BLOOD PRESSURE: 70 MMHG | HEIGHT: 69 IN

## 2018-02-08 DIAGNOSIS — E78.2 MIXED HYPERLIPIDEMIA: ICD-10-CM

## 2018-02-08 DIAGNOSIS — I10 ESSENTIAL HYPERTENSION: ICD-10-CM

## 2018-02-08 DIAGNOSIS — I06.0 RHEUMATIC AORTIC STENOSIS: ICD-10-CM

## 2018-02-08 PROCEDURE — 99205 OFFICE O/P NEW HI 60 MIN: CPT | Mod: 25 | Performed by: INTERNAL MEDICINE

## 2018-02-08 PROCEDURE — 94618 PULMONARY STRESS TESTING: CPT | Performed by: INTERNAL MEDICINE

## 2018-02-08 RX ORDER — SPIRONOLACTONE 25 MG/1
25 TABLET ORAL DAILY
Qty: 30 TAB | Refills: 11 | Status: SHIPPED | OUTPATIENT
Start: 2018-02-08

## 2018-02-08 ASSESSMENT — ENCOUNTER SYMPTOMS
DIZZINESS: 0
CHILLS: 0
STRIDOR: 0
SPUTUM PRODUCTION: 0
PALPITATIONS: 0
GASTROINTESTINAL NEGATIVE: 1
SHORTNESS OF BREATH: 1
WHEEZING: 0
PND: 0
BRUISES/BLEEDS EASILY: 0
CLAUDICATION: 0
EYES NEGATIVE: 1
ORTHOPNEA: 0
WEAKNESS: 0
FEVER: 0
COUGH: 0
SORE THROAT: 0
CONSTITUTIONAL NEGATIVE: 1
NEUROLOGICAL NEGATIVE: 1
MUSCULOSKELETAL NEGATIVE: 1
LOSS OF CONSCIOUSNESS: 0
HEMOPTYSIS: 0

## 2018-02-08 ASSESSMENT — MINNESOTA LIVING WITH HEART FAILURE QUESTIONNAIRE (MLHF)
DIFFICULTY GOING AWAY FROM HOME: 2
DIFFICULTY WORKING TO EARN A LIVING: 0
GIVING YOU SIDE EFFECTS FROM TREATMENTS: 0
MAKING YOU FEEL DEPRESSED: 0
MAKING YOU STAY IN A HOSPITAL: 5
DIFFICULTY SLEEPING WELL AT NIGHT: 0
HAVING TO SIT OR LIE DOWN DURING THE DAY: 3
DIFFICULTY TO CONCENTRATE OR REMEMBERING THINGS: 0
EATING LESS FOODS YOU LIKE: 0
DIFFICULTY SOCIALIZING WITH FAMILY OR FRIENDS: 0
TIRED, FATIGUED OR LOW ON ENERGY: 1
LOSS OF SELF CONTROL IN YOUR LIFE: 0
WALKING ABOUT OR CLIMBING STAIRS DIFFICULT: 4
DIFFICULTY WITH RECREATIONAL PASTIMES, SPORTS, HOBBIES: 0
WORKING AROUND THE HOUSE OR YARD DIFFICULT: 3
TOTAL_SCORE: 23
DIFFICULTY WITH SEXUAL ACTIVITIES: 0
FEELING LIKE A BURDEN TO FAMILY AND FRIENDS: 0
MAKING YOU SHORT OF BREATH: 0
MAKING YOU WORRY: 3
COSTING YOU MONEY FOR MEDICAL CARE: 0
SWELLING IN ANKLES OR LEGS: 2

## 2018-02-08 ASSESSMENT — 6 MINUTE WALK TEST (6MWT): TOTAL DISTANCE WALKED (METERS): 79.2

## 2018-02-08 NOTE — PROGRESS NOTES
Subjective:   Meghan Mcrae is a 67 y.o. female who presents today as a consultation for heart failure with preserved ejection fraction. She is a 67-year-old female with a past medical history of rheumatic heart disease as a child was hospitalized for approximately a week. She then says that she had a heart attack at the age of 25 for which she was hospitalized for a week and they treated her with antibiotics. She also smokes and recently was admitted to the hospital with symptoms of accelerating throat pain. It would come on with exertion and go away with rest. He would also radiate to her back reaching a maximum intensity of about a 5-7 out of 10. It would go away with rest. She had an echocardiogram showing some degree of aortic stenosis though the technical quality is somewhat limited. The aortic valve was deemed to be moderate by echocardiogram. She continues to have the same symptoms of exertional shortness of breath and jaw discomfort. She did start on Lasix for lower extremity edema. She also started on metoprolol.      Past Medical History:   Diagnosis Date   • Aortic stenosis    • Arthritis     scoliosis,    • Breath shortness    • Dental disorder     full mouth   • Heart burn    • Heart murmur    • HTN (hypertension)    • Hyperlipidemia    • Hypertension    • Indigestion    • Myocardial infarct     age 25 due to infection   • Pain    • Rheumatic fever     around age 11    • Rheumatic heart disease    • Snoring    • Urinary incontinence      Past Surgical History:   Procedure Laterality Date   • INCISION AND DRAINAGE GENERAL Left 8/17/2017    Procedure: INCISION AND DRAINAGE GENERAL GROIN WOUND;  Surgeon: Ilene Goff M.D.;  Location: [facility];  Service:    • INCISION AND DRAINAGE GENERAL Left 7/20/2017    Procedure: INCISION AND DRAINAGE GENERAL GROIN;  Surgeon: Ilene Goff M.D.;  Location: [facility];  Service:    • INGUINAL HERNIA REPAIR Left 5/2/2017     Procedure: INGUINAL HERNIA REPAIR-OPEN;  Surgeon: Ilene Goff M.D.;  Location: SURGERY SAME DAY [facility];  Service:    • SHOULDER ARTHROPLASTY TOTAL REVERSED Left 6/20/2016    Procedure: LEFT REVERSE TOTAL SHOULDER ;  Surgeon: Cal Vera M.D.;  Location: SURGERY [facility];  Service:    • SHOULDER ARTHROPLASTY TOTAL REVERSED  9/19/2013    Performed by Cal Vera M.D. at SURGERY [facility]   • GYN SURGERY      hysterectomy   • KNEE ARTHROPLASTY TOTAL Right ?2010   • OTHER ORTHOPEDIC SURGERY      left hip replacement,right knee replacement   • OTHER ORTHOPEDIC SURGERY      back     Family History   Problem Relation Age of Onset   • Arthritis Mother    • Cancer Mother    • Diabetes Mother    • Heart Failure Mother    • GI Mother    • Heart Attack Mother    • Heart Disease Mother    • Hypertension Mother    • Osteoporosis Mother    • Arthritis Father    • Cancer Father    • Heart Failure Father    • Heart Attack Father    • Heart Disease Father    • Cancer Sister    • Diabetes Sister    • Heart Failure Sister    • Cancer Brother    • Heart Failure Brother    • Cancer Sister    • Cancer Sister      History   Smoking Status   • Current Some Day Smoker   • Packs/day: 0.50   • Years: 40.00   • Types: Cigarettes   Smokeless Tobacco   • Never Used     Allergies   Allergen Reactions   • Contrave [Naltrexone-Bupropion Hcl Er] Diarrhea and Unspecified     Shaking, rigidity     Outpatient Encounter Prescriptions as of 2/8/2018   Medication Sig Dispense Refill   • spironolactone (ALDACTONE) 25 MG Tab Take 1 Tab by mouth every day. 30 Tab 11   • metoprolol (LOPRESSOR) 25 MG Tab Take 1 Tab by mouth 2 Times a Day. 60 Tab 0   • atorvastatin (LIPITOR) 20 MG Tab Take 1 Tab by mouth every bedtime. 30 Tab 0   • aspirin (ASA) 81 MG Chew Tab chewable tablet Take 1 Tab by mouth every day. 100 Tab 0   • lisinopril (PRINIVIL) 20 MG Tab Take 1 Tab by mouth every day. 30 Tab 0   • Ascorbic Acid (VITAMIN C PO) Take 1 Tab by  "mouth every day.     • Multiple Vitamins-Minerals (MULTIVITAMIN ADULT PO) Take 1 Tab by mouth every day.     • oxyCODONE CR (OXYCONTIN) 40 MG Tablet Extended Release 12 hour Abuse-Deterrent tablet Take 40 mg by mouth 3 times a day. Indications: Chronic Pain     • Cholecalciferol (VITAMIN D) 2000 UNITS Cap Take 1 Cap by mouth every day.     • Oxycodone-Acetaminophen (PERCOCET-10)  MG TABS Take 1-2 Tabs by mouth every four hours as needed (For breakthrough pain).     • [DISCONTINUED] furosemide (LASIX) 20 MG Tab Take 1 Tab by mouth every day. 30 Tab 0   • [DISCONTINUED] potassium chloride SA (K-DUR) 10 MEQ Tab CR Take 1 Tab by mouth every day for 30 days. 30 Each 0     No facility-administered encounter medications on file as of 2/8/2018.      Review of Systems   Constitutional: Negative.  Negative for chills, fever and malaise/fatigue.   HENT: Negative.  Negative for sore throat.    Eyes: Negative.    Respiratory: Positive for shortness of breath. Negative for cough, hemoptysis, sputum production, wheezing and stridor.    Cardiovascular: Positive for chest pain. Negative for palpitations, orthopnea, claudication, leg swelling and PND.   Gastrointestinal: Negative.    Genitourinary: Negative.    Musculoskeletal: Negative.    Skin: Negative.    Neurological: Negative.  Negative for dizziness, loss of consciousness and weakness.   Endo/Heme/Allergies: Negative.  Does not bruise/bleed easily.   All other systems reviewed and are negative.       Objective:   /70   Pulse 74   Ht 1.753 m (5' 9\")   Wt 105.7 kg (233 lb)   SpO2 (!) 81%   BMI 34.41 kg/m²     Physical Exam   Constitutional: She is oriented to person, place, and time. She appears well-developed and well-nourished. No distress.   HENT:   Head: Normocephalic.   Mouth/Throat: Oropharynx is clear and moist.   Eyes: EOM are normal. Pupils are equal, round, and reactive to light. Right eye exhibits no discharge. Left eye exhibits no discharge. No " scleral icterus.   Neck: Normal range of motion. Neck supple. No JVD present. No tracheal deviation present.   Cardiovascular: Normal rate, regular rhythm, S1 normal, S2 normal, intact distal pulses and normal pulses.  Exam reveals no gallop, no S3, no S4 and no friction rub.    Murmur heard.   Crescendo systolic murmur is present with a grade of 3/6    No diastolic murmur is present   Pulses:       Carotid pulses are 2+ on the right side, and 2+ on the left side.       Radial pulses are 2+ on the right side, and 2+ on the left side.        Dorsalis pedis pulses are 2+ on the right side, and 2+ on the left side.        Posterior tibial pulses are 2+ on the right side, and 2+ on the left side.   Preserved A2  Decreased carotid upstroke   Pulmonary/Chest: Effort normal and breath sounds normal. No respiratory distress. She has no wheezes. She has no rales.   Abdominal: Soft. Bowel sounds are normal. She exhibits no distension and no mass. There is no tenderness. There is no rebound and no guarding.   Musculoskeletal: She exhibits no edema.   Neurological: She is alert and oriented to person, place, and time. No cranial nerve deficit.   Skin: Skin is warm and dry. She is not diaphoretic. No pallor.   Psychiatric: She has a normal mood and affect. Her behavior is normal. Judgment and thought content normal.   Nursing note and vitals reviewed.      Assessment:     1. Rheumatic aortic stenosis  spironolactone (ALDACTONE) 25 MG Tab   2. Mixed hyperlipidemia  spironolactone (ALDACTONE) 25 MG Tab   3. Essential hypertension  spironolactone (ALDACTONE) 25 MG Tab       Medical Decision Making:  Today's Assessment / Status / Plan:     67-year-old female with moderate aortic stenosis by continuity equation but by visual inspection of the valve does appear somewhat more severe. She has a present A2 on exam. Decreased carotid upstroke. A absent A2 is specific for aortic stenosis but certainly not sensitive for severe aortic  stenosis. Given her ongoing chest pain as well as her other symptoms I think the most prudent thing to do would be to send her for a coronary angiogram with aortic valve study at the same time. Unfortunately she is currently out of network so we will figure out where this can be done most expediently for her. In the meantime I will stop her Lasix and start spironolactone for her lower extremity edema.    Thank for you allowing me to take part in your patient's care, please call should you have any questions or would like to discuss this patient.

## 2018-02-08 NOTE — LETTER
[facility]   [address] [place] [address]  [place] [address]  Phone: [phone]  Fax: [phone]              Meghan Mcrae  1950    Encounter Date: 2/8/2018    Robert Truong M.D.          PROGRESS NOTE:  Subjective:   Meghan Mcrae is a 67 y.o. female who presents today as a consultation for heart failure with preserved ejection fraction. She is a 67-year-old female with a past medical history of rheumatic heart disease as a child was hospitalized for approximately a week. She then says that she had a heart attack at the age of 25 for which she was hospitalized for a week and they treated her with antibiotics. She also smokes and recently was admitted to the hospital with symptoms of accelerating throat pain. It would come on with exertion and go away with rest. He would also radiate to her back reaching a maximum intensity of about a 5-7 out of 10. It would go away with rest. She had an echocardiogram showing some degree of aortic stenosis though the technical quality is somewhat limited. The aortic valve was deemed to be moderate by echocardiogram. She continues to have the same symptoms of exertional shortness of breath and jaw discomfort. She did start on Lasix for lower extremity edema. She also started on metoprolol.      Past Medical History:   Diagnosis Date   • Aortic stenosis    • Arthritis     scoliosis,    • Breath shortness    • Dental disorder     full mouth   • Heart burn    • Heart murmur    • HTN (hypertension)    • Hyperlipidemia    • Hypertension    • Indigestion    • Myocardial infarct     age 25 due to infection   • Pain    • Rheumatic fever     around age 11    • Rheumatic heart disease    • Snoring    • Urinary incontinence      Past Surgical History:   Procedure Laterality Date   • INCISION AND DRAINAGE GENERAL Left 8/17/2017    Procedure: INCISION AND DRAINAGE GENERAL GROIN WOUND;  Surgeon: Ilene Goff M.D.;   Location: SURGERY [facility];  Service:    • INCISION AND DRAINAGE GENERAL Left 7/20/2017    Procedure: INCISION AND DRAINAGE GENERAL GROIN;  Surgeon: Ilene Goff M.D.;  Location: SURGERY [facility];  Service:    • INGUINAL HERNIA REPAIR Left 5/2/2017    Procedure: INGUINAL HERNIA REPAIR-OPEN;  Surgeon: Ilene Goff M.D.;  Location: SURGERY SAME DAY [facility];  Service:    • SHOULDER ARTHROPLASTY TOTAL REVERSED Left 6/20/2016    Procedure: LEFT REVERSE TOTAL SHOULDER ;  Surgeon: Cal Vera M.D.;  Location: SURGERY [facility];  Service:    • SHOULDER ARTHROPLASTY TOTAL REVERSED  9/19/2013    Performed by Cal Vera M.D. at SURGERY [facility]   • GYN SURGERY      hysterectomy   • KNEE ARTHROPLASTY TOTAL Right ?2010   • OTHER ORTHOPEDIC SURGERY      left hip replacement,right knee replacement   • OTHER ORTHOPEDIC SURGERY      back     Family History   Problem Relation Age of Onset   • Arthritis Mother    • Cancer Mother    • Diabetes Mother    • Heart Failure Mother    • GI Mother    • Heart Attack Mother    • Heart Disease Mother    • Hypertension Mother    • Osteoporosis Mother    • Arthritis Father    • Cancer Father    • Heart Failure Father    • Heart Attack Father    • Heart Disease Father    • Cancer Sister    • Diabetes Sister    • Heart Failure Sister    • Cancer Brother    • Heart Failure Brother    • Cancer Sister    • Cancer Sister      History   Smoking Status   • Current Some Day Smoker   • Packs/day: 0.50   • Years: 40.00   • Types: Cigarettes   Smokeless Tobacco   • Never Used     Allergies   Allergen Reactions   • Contrave [Naltrexone-Bupropion Hcl Er] Diarrhea and Unspecified     Shaking, rigidity     Outpatient Encounter Prescriptions as of 2/8/2018   Medication Sig Dispense Refill   • spironolactone (ALDACTONE) 25 MG Tab Take 1 Tab by mouth every day. 30 Tab 11   • metoprolol (LOPRESSOR) 25 MG Tab Take 1 Tab by mouth 2 Times a Day. 60 Tab 0   • atorvastatin  "(LIPITOR) 20 MG Tab Take 1 Tab by mouth every bedtime. 30 Tab 0   • aspirin (ASA) 81 MG Chew Tab chewable tablet Take 1 Tab by mouth every day. 100 Tab 0   • lisinopril (PRINIVIL) 20 MG Tab Take 1 Tab by mouth every day. 30 Tab 0   • Ascorbic Acid (VITAMIN C PO) Take 1 Tab by mouth every day.     • Multiple Vitamins-Minerals (MULTIVITAMIN ADULT PO) Take 1 Tab by mouth every day.     • oxyCODONE CR (OXYCONTIN) 40 MG Tablet Extended Release 12 hour Abuse-Deterrent tablet Take 40 mg by mouth 3 times a day. Indications: Chronic Pain     • Cholecalciferol (VITAMIN D) 2000 UNITS Cap Take 1 Cap by mouth every day.     • Oxycodone-Acetaminophen (PERCOCET-10)  MG TABS Take 1-2 Tabs by mouth every four hours as needed (For breakthrough pain).     • [DISCONTINUED] furosemide (LASIX) 20 MG Tab Take 1 Tab by mouth every day. 30 Tab 0   • [DISCONTINUED] potassium chloride SA (K-DUR) 10 MEQ Tab CR Take 1 Tab by mouth every day for 30 days. 30 Each 0     No facility-administered encounter medications on file as of 2/8/2018.      Review of Systems   Constitutional: Negative.  Negative for chills, fever and malaise/fatigue.   HENT: Negative.  Negative for sore throat.    Eyes: Negative.    Respiratory: Positive for shortness of breath. Negative for cough, hemoptysis, sputum production, wheezing and stridor.    Cardiovascular: Positive for chest pain. Negative for palpitations, orthopnea, claudication, leg swelling and PND.   Gastrointestinal: Negative.    Genitourinary: Negative.    Musculoskeletal: Negative.    Skin: Negative.    Neurological: Negative.  Negative for dizziness, loss of consciousness and weakness.   Endo/Heme/Allergies: Negative.  Does not bruise/bleed easily.   All other systems reviewed and are negative.       Objective:   /70   Pulse 74   Ht 1.753 m (5' 9\")   Wt 105.7 kg (233 lb)   SpO2 (!) 81%   BMI 34.41 kg/m²      Physical Exam   Constitutional: She is oriented to person, place, and time. She " appears well-developed and well-nourished. No distress.   HENT:   Head: Normocephalic.   Mouth/Throat: Oropharynx is clear and moist.   Eyes: EOM are normal. Pupils are equal, round, and reactive to light. Right eye exhibits no discharge. Left eye exhibits no discharge. No scleral icterus.   Neck: Normal range of motion. Neck supple. No JVD present. No tracheal deviation present.   Cardiovascular: Normal rate, regular rhythm, S1 normal, S2 normal, intact distal pulses and normal pulses.  Exam reveals no gallop, no S3, no S4 and no friction rub.    Murmur heard.   Crescendo systolic murmur is present with a grade of 3/6    No diastolic murmur is present   Pulses:       Carotid pulses are 2+ on the right side, and 2+ on the left side.       Radial pulses are 2+ on the right side, and 2+ on the left side.        Dorsalis pedis pulses are 2+ on the right side, and 2+ on the left side.        Posterior tibial pulses are 2+ on the right side, and 2+ on the left side.   Preserved A2  Decreased carotid upstroke   Pulmonary/Chest: Effort normal and breath sounds normal. No respiratory distress. She has no wheezes. She has no rales.   Abdominal: Soft. Bowel sounds are normal. She exhibits no distension and no mass. There is no tenderness. There is no rebound and no guarding.   Musculoskeletal: She exhibits no edema.   Neurological: She is alert and oriented to person, place, and time. No cranial nerve deficit.   Skin: Skin is warm and dry. She is not diaphoretic. No pallor.   Psychiatric: She has a normal mood and affect. Her behavior is normal. Judgment and thought content normal.   Nursing note and vitals reviewed.      Assessment:     1. Rheumatic aortic stenosis  spironolactone (ALDACTONE) 25 MG Tab   2. Mixed hyperlipidemia  spironolactone (ALDACTONE) 25 MG Tab   3. Essential hypertension  spironolactone (ALDACTONE) 25 MG Tab       Medical Decision Making:  Today's Assessment / Status / Plan:     67-year-old female with  moderate aortic stenosis by continuity equation but by visual inspection of the valve does appear somewhat more severe. She has a present A2 on exam. Decreased carotid upstroke. A absent A2 is specific for aortic stenosis but certainly not sensitive for severe aortic stenosis. Given her ongoing chest pain as well as her other symptoms I think the most prudent thing to do would be to send her for a coronary angiogram with aortic valve study at the same time. Unfortunately she is currently out of network so we will figure out where this can be done most expediently for her. In the meantime I will stop her Lasix and start spironolactone for her lower extremity edema.    Thank for you allowing me to take part in your patient's care, please call should you have any questions or would like to discuss this patient.      Jose Sheth, FRANCESCO.  [address] [place] [id number]  VIA Facsimile: [phone]

## 2018-05-03 ENCOUNTER — OFFICE VISIT (OUTPATIENT)
Dept: CARDIOLOGY | Facility: MEDICAL CENTER | Age: 68
End: 2018-05-03
Payer: OTHER MISCELLANEOUS

## 2018-05-03 VITALS
OXYGEN SATURATION: 84 % | HEIGHT: 69 IN | DIASTOLIC BLOOD PRESSURE: 82 MMHG | WEIGHT: 241 LBS | SYSTOLIC BLOOD PRESSURE: 136 MMHG | HEART RATE: 76 BPM | BODY MASS INDEX: 35.7 KG/M2

## 2018-05-03 DIAGNOSIS — E78.2 MIXED HYPERLIPIDEMIA: ICD-10-CM

## 2018-05-03 DIAGNOSIS — I06.0 RHEUMATIC AORTIC STENOSIS: ICD-10-CM

## 2018-05-03 DIAGNOSIS — I10 ESSENTIAL HYPERTENSION: ICD-10-CM

## 2018-05-03 PROCEDURE — 99214 OFFICE O/P EST MOD 30 MIN: CPT | Performed by: INTERNAL MEDICINE

## 2018-05-03 ASSESSMENT — ENCOUNTER SYMPTOMS
STRIDOR: 0
CARDIOVASCULAR NEGATIVE: 1
WHEEZING: 0
BRUISES/BLEEDS EASILY: 0
COUGH: 0
SORE THROAT: 0
NEUROLOGICAL NEGATIVE: 1
CLAUDICATION: 0
PALPITATIONS: 0
SHORTNESS OF BREATH: 0
PND: 0
RESPIRATORY NEGATIVE: 1
DIZZINESS: 0
SPUTUM PRODUCTION: 0
FEVER: 0
HEMOPTYSIS: 0
CONSTITUTIONAL NEGATIVE: 1
EYES NEGATIVE: 1
GASTROINTESTINAL NEGATIVE: 1
WEAKNESS: 0
CHILLS: 0
MUSCULOSKELETAL NEGATIVE: 1
ORTHOPNEA: 0
LOSS OF CONSCIOUSNESS: 0

## 2018-05-03 NOTE — LETTER
[facility]   [address] [place] [address] 400  [place] [address]  Phone: [phone]  Fax: [phone]              Meghan Mcrae  1950    Encounter Date: 5/3/2018    Robert Truong M.D.          PROGRESS NOTE:  Chief Complaint   Patient presents with   • Aortic Stenosis     F/V: 4 WEEK       Subjective:   Meghan Mcrae is a 67 y.o. female who presents today as a follow-up for her exertional throat tightness and her aortic valve. We referred her to [place] for a angiogram and aortic valve study however this was out of her network and she did want to have it done. She returns today with she says no insurance but we still can have accepted here. Her lower extremity is essentially gone. She still gets throat tightness with exertion which goes away within 10-15 minutes of rest. Her lower extremity decubitus better controlled. She continues on oxygen outpatient at home but is not on it today. Her oxygen check and is in the mid 80s. He still having exertional shortness of breath.    Past Medical History:   Diagnosis Date   • Aortic stenosis    • Arthritis     scoliosis,    • Breath shortness    • Dental disorder     full mouth   • Heart burn    • Heart murmur    • HTN (hypertension)    • Hyperlipidemia    • Hypertension    • Indigestion    • Myocardial infarct (HCC)     age 25 due to infection   • Pain    • Rheumatic fever     around age 11    • Rheumatic heart disease    • Snoring    • Urinary incontinence      Past Surgical History:   Procedure Laterality Date   • INCISION AND DRAINAGE GENERAL Left 8/17/2017    Procedure: INCISION AND DRAINAGE GENERAL GROIN WOUND;  Surgeon: Ilene Goff M.D.;  Location: SURGERY [facility];  Service:    • INCISION AND DRAINAGE GENERAL Left 7/20/2017    Procedure: INCISION AND DRAINAGE GENERAL GROIN;  Surgeon: Ilene Goff M.D.;  Location: SURGERY [facility];  Service:    • INGUINAL HERNIA REPAIR Left  5/2/2017    Procedure: INGUINAL HERNIA REPAIR-OPEN;  Surgeon: Ilene Goff M.D.;  Location: SURGERY SAME DAY [facility];  Service:    • SHOULDER ARTHROPLASTY TOTAL REVERSED Left 6/20/2016    Procedure: LEFT REVERSE TOTAL SHOULDER ;  Surgeon: Cal Vera M.D.;  Location: SURGERY [facility];  Service:    • SHOULDER ARTHROPLASTY TOTAL REVERSED  9/19/2013    Performed by Cal Vera M.D. at SURGERY [facility]   • GYN SURGERY      hysterectomy   • KNEE ARTHROPLASTY TOTAL Right ?2010   • OTHER ORTHOPEDIC SURGERY      left hip replacement,right knee replacement   • OTHER ORTHOPEDIC SURGERY      back     Family History   Problem Relation Age of Onset   • Arthritis Mother    • Cancer Mother    • Diabetes Mother    • Heart Failure Mother    • GI Mother    • Heart Attack Mother    • Heart Disease Mother    • Hypertension Mother    • Osteoporosis Mother    • Arthritis Father    • Cancer Father    • Heart Failure Father    • Heart Attack Father    • Heart Disease Father    • Cancer Sister    • Diabetes Sister    • Heart Failure Sister    • Cancer Brother    • Heart Failure Brother    • Cancer Sister    • Cancer Sister      Social History     Social History   • Marital status:      Spouse name: N/A   • Number of children: N/A   • Years of education: N/A     Occupational History   • Not on file.     Social History Main Topics   • Smoking status: Current Some Day Smoker     Packs/day: 0.50     Years: 40.00     Types: Cigarettes   • Smokeless tobacco: Never Used   • Alcohol use No   • Drug use: No   • Sexual activity: No     Other Topics Concern   • Not on file     Social History Narrative   • No narrative on file     Allergies   Allergen Reactions   • Contrave [Naltrexone-Bupropion Hcl Er] Diarrhea and Unspecified     Shaking, rigidity     Outpatient Encounter Prescriptions as of 5/3/2018   Medication Sig Dispense Refill   • spironolactone (ALDACTONE) 25 MG Tab Take 1 Tab by mouth every day. 30 Tab 11   •  "metoprolol (LOPRESSOR) 25 MG Tab Take 1 Tab by mouth 2 Times a Day. 60 Tab 0   • atorvastatin (LIPITOR) 20 MG Tab Take 1 Tab by mouth every bedtime. 30 Tab 0   • aspirin (ASA) 81 MG Chew Tab chewable tablet Take 1 Tab by mouth every day. 100 Tab 0   • lisinopril (PRINIVIL) 20 MG Tab Take 1 Tab by mouth every day. 30 Tab 0   • Ascorbic Acid (VITAMIN C PO) Take 1 Tab by mouth every day.     • Multiple Vitamins-Minerals (MULTIVITAMIN ADULT PO) Take 1 Tab by mouth every day.     • oxyCODONE CR (OXYCONTIN) 40 MG Tablet Extended Release 12 hour Abuse-Deterrent tablet Take 40 mg by mouth 3 times a day. Indications: Chronic Pain     • Cholecalciferol (VITAMIN D) 2000 UNITS Cap Take 1 Cap by mouth every day.     • Oxycodone-Acetaminophen (PERCOCET-10)  MG TABS Take 1-2 Tabs by mouth every four hours as needed (For breakthrough pain).       No facility-administered encounter medications on file as of 5/3/2018.      Review of Systems   Constitutional: Negative.  Negative for chills, fever and malaise/fatigue.   HENT: Negative.  Negative for sore throat.    Eyes: Negative.    Respiratory: Negative.  Negative for cough, hemoptysis, sputum production, shortness of breath, wheezing and stridor.    Cardiovascular: Negative.  Negative for chest pain, palpitations, orthopnea, claudication, leg swelling and PND.   Gastrointestinal: Negative.    Genitourinary: Negative.    Musculoskeletal: Negative.    Skin: Negative.    Neurological: Negative.  Negative for dizziness, loss of consciousness and weakness.   Endo/Heme/Allergies: Negative.  Does not bruise/bleed easily.   All other systems reviewed and are negative.       Objective:   /82   Pulse 76   Ht 1.753 m (5' 9\")   Wt 109.3 kg (241 lb)   SpO2 (!) 84%   BMI 35.59 kg/m²      Physical Exam   Constitutional: She appears well-developed and well-nourished. No distress.   HENT:   Head: Normocephalic and atraumatic.   Right Ear: External ear normal.   Left Ear: External " ear normal.   Nose: Nose normal.   Mouth/Throat: No oropharyngeal exudate.   Eyes: Conjunctivae and EOM are normal. Pupils are equal, round, and reactive to light. Right eye exhibits no discharge. Left eye exhibits no discharge. No scleral icterus.   Neck: Neck supple. No JVD present.   Cardiovascular: Normal rate, regular rhythm and intact distal pulses.  Exam reveals no gallop and no friction rub.    Murmur heard.  Pulmonary/Chest: Effort normal. No stridor. No respiratory distress. She has no wheezes. She has no rales. She exhibits no tenderness.   Abdominal: Soft. She exhibits no distension. There is no guarding.   Musculoskeletal: Normal range of motion. She exhibits no edema, tenderness or deformity.   Neurological: She is alert. She has normal reflexes. She displays normal reflexes. No cranial nerve deficit. She exhibits normal muscle tone. Coordination normal.   Skin: Skin is warm and dry. No rash noted. She is not diaphoretic. No erythema. No pallor.   Psychiatric: She has a normal mood and affect. Her behavior is normal. Judgment and thought content normal.   Nursing note and vitals reviewed.      Assessment:     1. Rheumatic aortic stenosis     2. Mixed hyperlipidemia     3. Essential hypertension         Medical Decision Making:  Today's Assessment / Status / Plan:     67-year-old female with COPD oxygen dependence and some degree of aortic stenosis probably moderate by exam potentially severe. She would like to get her hip fixed and would like to have this done before being referred for an angiogram. I did go over with her that if she does have severe aortic stenosis this would put her at very high risk for surgical complications. She needs get her aortic valve studied in her coronaries studied as quickly as possible. I told her to call her insurance company to find out where she'll be covered in network. She is clinically stable and doesn't need to be admitted through the ER today however she is very  tenuous. Also with her low oxygen she may need 24-hour oxygen she needs to be seen by her primary.    Thank for you allowing me to take part in your patient's care, please call should you have any questions or would like to discuss this patient.      YURIDIA Pitts  [address] [facility] [address]  VIA Facsimile: [phone]

## 2018-05-03 NOTE — PROGRESS NOTES
Chief Complaint   Patient presents with   • Aortic Stenosis     F/V: 4 WEEK       Subjective:   Meghan Mcrae is a 67 y.o. female who presents today as a follow-up for her exertional throat tightness and her aortic valve. We referred her to [place] for a angiogram and aortic valve study however this was out of her network and she did want to have it done. She returns today with she says no insurance but we still can have accepted here. Her lower extremity is essentially gone. She still gets throat tightness with exertion which goes away within 10-15 minutes of rest. Her lower extremity decubitus better controlled. She continues on oxygen outpatient at home but is not on it today. Her oxygen check and is in the mid 80s. He still having exertional shortness of breath.    Past Medical History:   Diagnosis Date   • Aortic stenosis    • Arthritis     scoliosis,    • Breath shortness    • Dental disorder     full mouth   • Heart burn    • Heart murmur    • HTN (hypertension)    • Hyperlipidemia    • Hypertension    • Indigestion    • Myocardial infarct (HCC)     age 25 due to infection   • Pain    • Rheumatic fever     around age 11    • Rheumatic heart disease    • Snoring    • Urinary incontinence      Past Surgical History:   Procedure Laterality Date   • INCISION AND DRAINAGE GENERAL Left 8/17/2017    Procedure: INCISION AND DRAINAGE GENERAL GROIN WOUND;  Surgeon: Ilene Goff M.D.;  Location: SURGERY [facility];  Service:    • INCISION AND DRAINAGE GENERAL Left 7/20/2017    Procedure: INCISION AND DRAINAGE GENERAL GROIN;  Surgeon: Ilene Goff M.D.;  Location: SURGERY [facility];  Service:    • INGUINAL HERNIA REPAIR Left 5/2/2017    Procedure: INGUINAL HERNIA REPAIR-OPEN;  Surgeon: Ilene Goff M.D.;  Location: SURGERY SAME DAY [facility];  Service:    • SHOULDER ARTHROPLASTY TOTAL REVERSED Left 6/20/2016    Procedure: LEFT REVERSE TOTAL SHOULDER ;  Surgeon: Cal Vera M.D.;   Location: SURGERY [facility];  Service:    • SHOULDER ARTHROPLASTY TOTAL REVERSED  9/19/2013    Performed by Cal Vera M.D. at SURGERY [facility]   • GYN SURGERY      hysterectomy   • KNEE ARTHROPLASTY TOTAL Right ?2010   • OTHER ORTHOPEDIC SURGERY      left hip replacement,right knee replacement   • OTHER ORTHOPEDIC SURGERY      back     Family History   Problem Relation Age of Onset   • Arthritis Mother    • Cancer Mother    • Diabetes Mother    • Heart Failure Mother    • GI Mother    • Heart Attack Mother    • Heart Disease Mother    • Hypertension Mother    • Osteoporosis Mother    • Arthritis Father    • Cancer Father    • Heart Failure Father    • Heart Attack Father    • Heart Disease Father    • Cancer Sister    • Diabetes Sister    • Heart Failure Sister    • Cancer Brother    • Heart Failure Brother    • Cancer Sister    • Cancer Sister      Social History     Social History   • Marital status:      Spouse name: N/A   • Number of children: N/A   • Years of education: N/A     Occupational History   • Not on file.     Social History Main Topics   • Smoking status: Current Some Day Smoker     Packs/day: 0.50     Years: 40.00     Types: Cigarettes   • Smokeless tobacco: Never Used   • Alcohol use No   • Drug use: No   • Sexual activity: No     Other Topics Concern   • Not on file     Social History Narrative   • No narrative on file     Allergies   Allergen Reactions   • Contrave [Naltrexone-Bupropion Hcl Er] Diarrhea and Unspecified     Shaking, rigidity     Outpatient Encounter Prescriptions as of 5/3/2018   Medication Sig Dispense Refill   • spironolactone (ALDACTONE) 25 MG Tab Take 1 Tab by mouth every day. 30 Tab 11   • metoprolol (LOPRESSOR) 25 MG Tab Take 1 Tab by mouth 2 Times a Day. 60 Tab 0   • atorvastatin (LIPITOR) 20 MG Tab Take 1 Tab by mouth every bedtime. 30 Tab 0   • aspirin (ASA) 81 MG Chew Tab chewable tablet Take 1 Tab by mouth every day. 100 Tab 0   • lisinopril (PRINIVIL) 20  "MG Tab Take 1 Tab by mouth every day. 30 Tab 0   • Ascorbic Acid (VITAMIN C PO) Take 1 Tab by mouth every day.     • Multiple Vitamins-Minerals (MULTIVITAMIN ADULT PO) Take 1 Tab by mouth every day.     • oxyCODONE CR (OXYCONTIN) 40 MG Tablet Extended Release 12 hour Abuse-Deterrent tablet Take 40 mg by mouth 3 times a day. Indications: Chronic Pain     • Cholecalciferol (VITAMIN D) 2000 UNITS Cap Take 1 Cap by mouth every day.     • Oxycodone-Acetaminophen (PERCOCET-10)  MG TABS Take 1-2 Tabs by mouth every four hours as needed (For breakthrough pain).       No facility-administered encounter medications on file as of 5/3/2018.      Review of Systems   Constitutional: Negative.  Negative for chills, fever and malaise/fatigue.   HENT: Negative.  Negative for sore throat.    Eyes: Negative.    Respiratory: Negative.  Negative for cough, hemoptysis, sputum production, shortness of breath, wheezing and stridor.    Cardiovascular: Negative.  Negative for chest pain, palpitations, orthopnea, claudication, leg swelling and PND.   Gastrointestinal: Negative.    Genitourinary: Negative.    Musculoskeletal: Negative.    Skin: Negative.    Neurological: Negative.  Negative for dizziness, loss of consciousness and weakness.   Endo/Heme/Allergies: Negative.  Does not bruise/bleed easily.   All other systems reviewed and are negative.       Objective:   /82   Pulse 76   Ht 1.753 m (5' 9\")   Wt 109.3 kg (241 lb)   SpO2 (!) 84%   BMI 35.59 kg/m²     Physical Exam   Constitutional: She appears well-developed and well-nourished. No distress.   HENT:   Head: Normocephalic and atraumatic.   Right Ear: External ear normal.   Left Ear: External ear normal.   Nose: Nose normal.   Mouth/Throat: No oropharyngeal exudate.   Eyes: Conjunctivae and EOM are normal. Pupils are equal, round, and reactive to light. Right eye exhibits no discharge. Left eye exhibits no discharge. No scleral icterus.   Neck: Neck supple. No JVD " present.   Cardiovascular: Normal rate, regular rhythm and intact distal pulses.  Exam reveals no gallop and no friction rub.    Murmur heard.  Pulmonary/Chest: Effort normal. No stridor. No respiratory distress. She has no wheezes. She has no rales. She exhibits no tenderness.   Abdominal: Soft. She exhibits no distension. There is no guarding.   Musculoskeletal: Normal range of motion. She exhibits no edema, tenderness or deformity.   Neurological: She is alert. She has normal reflexes. She displays normal reflexes. No cranial nerve deficit. She exhibits normal muscle tone. Coordination normal.   Skin: Skin is warm and dry. No rash noted. She is not diaphoretic. No erythema. No pallor.   Psychiatric: She has a normal mood and affect. Her behavior is normal. Judgment and thought content normal.   Nursing note and vitals reviewed.      Assessment:     1. Rheumatic aortic stenosis     2. Mixed hyperlipidemia     3. Essential hypertension         Medical Decision Making:  Today's Assessment / Status / Plan:     67-year-old female with COPD oxygen dependence and some degree of aortic stenosis probably moderate by exam potentially severe. She would like to get her hip fixed and would like to have this done before being referred for an angiogram. I did go over with her that if she does have severe aortic stenosis this would put her at very high risk for surgical complications. She needs get her aortic valve studied in her coronaries studied as quickly as possible. I told her to call her insurance company to find out where she'll be covered in network. She is clinically stable and doesn't need to be admitted through the ER today however she is very tenuous. Also with her low oxygen she may need 24-hour oxygen she needs to be seen by her primary.    Thank for you allowing me to take part in your patient's care, please call should you have any questions or would like to discuss this patient.

## 2018-05-14 NOTE — PROGRESS NOTES
"Heart Failure New Appointment     Per MD, education pending further testing.    6MWT- 79.2 meters, walked for 2:27 minutes-stopped d/t SOB  MLWHF- 23    OP Heart Failure  Vitals  Appointment Type: Heart Failure New  Weight: 105.7 kg (233 lb)  Weight Source: Stand Up Scale  Height: 175.3 cm (5' 9\")  BMI (Calculated): 34.41  Blood Pressure : 130/70  Pulse: 74    System Assessment  NYHA Functional Class Assessment:  (Per MD, not HF-not staged/classed)     Smoking Hx  Have you Ever Smoked: Yes  Have you Smoked in the Last 12 Mos: Yes  Have you Quit Smoking: No   Smoking Cessation Offered: Patient Counseled    Alcohol Hx  Do you Drink?: No     Illicit Drug Hx  Illicit Drug History: No    [place] Living with Heart Failure  Swelling in Ankles or Legs: 2  Having to Sit or Lie Down During the Day: 3  Walking About or Climbing Stairs Difficult: 4  Working Around the House or Yard Difficult: 3  Difficulty Going Away from Home: 2  Difficulty Sleeping Well at Night: 0  Difficulty Socializing with Family or Friends: 0  Difficulty Working to Earn a Livin  Difficulty with Recreational Pastimes, Sports, Hobbies: 0  Difficulty with Sexual Activities: 0  Eating Less Foods You Like: 0  Making you Short of Breath: 0  Tired, Fatigued or Low on Energy: 1  Making you Stay in a Hospital: 5  Costing you Money for Medical Care?: 0  Giving you Side Effects from Treatments: 0  Feeling like a [place] to Family and Friends: 0  Loss of Self Control in your Life: 0  Making You Worry: 3  Difficulty to Concentrate or Remembering Things: 0  Making you Feel Depressed: 0  MLF Total Score : 23    6 Minute Walk Test  Baseline to end of test: 2:27  Total meters walked: 79.2  Comments: stopped d/t SOB        MARLON Rushing RN  [phone]  "

## 2019-05-16 ENCOUNTER — HOSPITAL ENCOUNTER (OUTPATIENT)
Facility: MEDICAL CENTER | Age: 69
End: 2019-05-16
Attending: ORTHOPAEDIC SURGERY | Admitting: ORTHOPAEDIC SURGERY
Payer: COMMERCIAL

## 2019-06-05 VITALS — WEIGHT: 246.03 LBS | BODY MASS INDEX: 37.29 KG/M2 | HEIGHT: 68 IN

## 2019-06-05 DIAGNOSIS — Z01.812 PRE-OPERATIVE LABORATORY EXAMINATION: ICD-10-CM

## 2019-06-05 DIAGNOSIS — Z01.810 PRE-OPERATIVE CARDIOVASCULAR EXAMINATION: ICD-10-CM

## 2019-06-05 LAB
ANION GAP SERPL CALC-SCNC: 6 MMOL/L (ref 0–11.9)
BUN SERPL-MCNC: 15 MG/DL (ref 8–22)
CALCIUM SERPL-MCNC: 9.5 MG/DL (ref 8.5–10.5)
CHLORIDE SERPL-SCNC: 102 MMOL/L (ref 96–112)
CO2 SERPL-SCNC: 29 MMOL/L (ref 20–33)
CREAT SERPL-MCNC: 0.71 MG/DL (ref 0.5–1.4)
EKG IMPRESSION: NORMAL
GLUCOSE SERPL-MCNC: 85 MG/DL (ref 65–99)
POTASSIUM SERPL-SCNC: 4.4 MMOL/L (ref 3.6–5.5)
SODIUM SERPL-SCNC: 137 MMOL/L (ref 135–145)

## 2019-06-05 PROCEDURE — 93010 ELECTROCARDIOGRAM REPORT: CPT | Performed by: INTERNAL MEDICINE

## 2019-06-05 PROCEDURE — 85027 COMPLETE CBC AUTOMATED: CPT

## 2019-06-05 PROCEDURE — 93005 ELECTROCARDIOGRAM TRACING: CPT

## 2019-06-05 PROCEDURE — 80048 BASIC METABOLIC PNL TOTAL CA: CPT

## 2019-06-05 PROCEDURE — 36415 COLL VENOUS BLD VENIPUNCTURE: CPT

## 2019-06-05 PROCEDURE — 87640 STAPH A DNA AMP PROBE: CPT

## 2019-06-05 PROCEDURE — 87641 MR-STAPH DNA AMP PROBE: CPT

## 2019-06-05 NOTE — OR NURSING
"Hx and meds reviewed, pre op instructions given. Pt aware may take the listed meds morning of surgery;atorvastatin and metoprolol. Anesthesia fasting guidelines reviewed with pt.Pt denies past issues with anesthesia.     TOTAL JOINT REPLACEMENT SURGERY   PreAdmit Appointment  Pre-Operative Education Note       1) Did you take a Total Joint Replacement Pre-Operative Education class?  Where?  Answer: No    2) If you did not take a class, did you receive pre-op education in the form of a book or through an online class?    Answer: No    3) Have you had the same joint replacement procedure within the last 3 years?   Answer:No in 2015 or earlier had hip,shoulder and knee replacements    For patients who answered \"No\" to the above questions:     4) Was patient given information on [facility] Pre-Op Education class through the Pre-Op Education Class flyer or the Alternative Pre-op Education flyer?   Answer: Only for online class    5) Was a [facility] Total Joint Replacement Patient Guide binder handed out?   Answer:Yes    6) Did the patient refuse preoperative education?  Answer:  classes yes, book no  "

## 2019-06-05 NOTE — OR NURSING
Dr aWrd aware of hx. Pt unable to void for UA, (had voided prior) ordered per Dr Mohan. Refused to stay and try later. [facility] pt after she left so I left message at MD office to maybe send to lab close to pt's home.

## 2019-06-05 NOTE — DISCHARGE PLANNING
DISCHARGE PLANNING NOTE - TOTAL JOINT     Procedure: Procedure(s):  ARTHROPLASTY, HIP, TOTAL  Procedure Date: 6/10/2019  Insurance:  Payor: AET[employer] / Plan: AET[employer] / Product Type: Multiple /   Equipment currently available at home? bedside commode, cane, front-wheel walker and shower chair  Steps into the home? 2.5  Steps within the home? none  Toilet height? Standard  Type of shower? tub-shower with bars and hose  Who will be with you during your recovery? Spouse-Sai  Is Outpatient Physical Therapy set up after surgery? No   Did you take the Total Joint Class and where? No-declined   patient had classes with [employer] [facility] in 2010      Plan:  Met with patient and spouse during preadmission appointment.  Reviewed Equipment Resource list and provided a copy to the patient.  Provided and reviewed Home Safety Checklist.  Quiet Hours information given and reviewed.  There are no identified discharge needs at this time.  Anticipate discharge home with family.  No Dme ordered/needed at this time.

## 2019-06-06 LAB
ERYTHROCYTE [DISTWIDTH] IN BLOOD BY AUTOMATED COUNT: 46.4 FL (ref 35.9–50)
HCT VFR BLD AUTO: 47.6 % (ref 37–47)
HGB BLD-MCNC: 15.2 G/DL (ref 12–16)
MCH RBC QN AUTO: 29.9 PG (ref 27–33)
MCHC RBC AUTO-ENTMCNC: 31.9 G/DL (ref 33.6–35)
MCV RBC AUTO: 93.5 FL (ref 81.4–97.8)
PLATELET # BLD AUTO: 140 K/UL (ref 164–446)
PMV BLD AUTO: 12 FL (ref 9–12.9)
RBC # BLD AUTO: 5.09 M/UL (ref 4.2–5.4)
SCCMEC + MECA PNL NOSE NAA+PROBE: NEGATIVE
SCCMEC + MECA PNL NOSE NAA+PROBE: NEGATIVE
WBC # BLD AUTO: 6.2 K/UL (ref 4.8–10.8)

## 2019-06-06 NOTE — OR NURSING
Dr Ward's response;  The problem here is dyspnea in the setting of aortic stenosis.  It would be best for this patient to get a cardiac clearance prior to this type of surgery.  Thank you.     Agustina Ward M.D.  [facility]   Notified Dr Mohan of request for clearance.

## 2019-07-03 ENCOUNTER — OFFICE VISIT (OUTPATIENT)
Dept: CARDIOLOGY | Facility: PHYSICIAN GROUP | Age: 69
End: 2019-07-03
Payer: COMMERCIAL

## 2019-07-03 VITALS
OXYGEN SATURATION: 86 % | DIASTOLIC BLOOD PRESSURE: 70 MMHG | HEART RATE: 70 BPM | WEIGHT: 247 LBS | SYSTOLIC BLOOD PRESSURE: 140 MMHG | HEIGHT: 68 IN | BODY MASS INDEX: 37.44 KG/M2

## 2019-07-03 DIAGNOSIS — E78.2 MIXED HYPERLIPIDEMIA: ICD-10-CM

## 2019-07-03 DIAGNOSIS — I27.29 OTHER SECONDARY PULMONARY HYPERTENSION (HCC): ICD-10-CM

## 2019-07-03 DIAGNOSIS — Z72.0 TOBACCO USE: ICD-10-CM

## 2019-07-03 DIAGNOSIS — R07.89 OTHER CHEST PAIN: ICD-10-CM

## 2019-07-03 DIAGNOSIS — I35.0 NONRHEUMATIC AORTIC VALVE STENOSIS: ICD-10-CM

## 2019-07-03 DIAGNOSIS — I10 ESSENTIAL HYPERTENSION: ICD-10-CM

## 2019-07-03 DIAGNOSIS — Z01.810 PREOP CARDIOVASCULAR EXAM: ICD-10-CM

## 2019-07-03 PROCEDURE — 99214 OFFICE O/P EST MOD 30 MIN: CPT | Performed by: INTERNAL MEDICINE

## 2019-07-03 RX ORDER — BACLOFEN 10 MG/1
10 TABLET ORAL 3 TIMES DAILY
COMMUNITY

## 2019-07-03 NOTE — PROGRESS NOTES
Subjective:   Chief Complaint:   Chief Complaint   Patient presents with   • Preoperative CV Eval   • Aortic Stenosis       Meghan Mcrae is a 68 y.o. female who is here for preoperative risk assessment.  She is going to have right MANSOOR with general anesthesia. Most recent ortho surgery was .    She had been reporting throat tightness, would come on at rest or activity. She was going to have a heart cath but no insurance at the time that would cover this so it was not done.    She is not limited by chest pain, pressure or tightness.   No significant dyspnea on exertion, orthopnea or lower extremity swelling.   No significant palpitations, dizziness, or presyncope/syncope.   No stroke/TIA like symptoms.  She is limited by significant hip pain, uses a cane.  Also has spine scoliosis.  Prior stress testing in , was normal.  Unable to perform at least 4 METS.    Has hypertension, blood pressure slightly elevated, takes 3 medications.  Has hyperlipidemia, LDL cholesterol 81, on Lipitor 20 mg.  Takes primary prevention aspirin.  Still smoking, has cut down.    Father  of MI at 62.  Mother had MI at 62 or so.  Sister  of MI at 70, brother at 69.    DATA REVIEWED by me:  ECG 2019  Sinus, rate 73    Echo 2018  EF 60%, mild concentric LVH, moderately dilated left atrium, moderate aortic stenosis with a mean gradient of 26, RVSP 53 mmHg.    6-minute walk test 2018  80 m.  Patient used a cane.    Most recent labs:     2019 hemoglobin 15.2, was 140, sodium 137, potassium 4.4, creatinine 0.71    2018 LDL 81, HDL 42, try glycerides 128, total cholesterol 149    2018  LFTs normal    Past Medical History:   Diagnosis Date   • Aortic stenosis    • Arthritis     scoliosis,    • Breath shortness     Uses 2l at night- [facility] in [place]   • Dental disorder     full mouth   • Heart burn    • Heart murmur    • HTN (hypertension)    • Hyperlipidemia    • Hypertension    • Indigestion     • Myocardial infarct (HCC)     age 25 due to infection   • Pain 2019    R hip   • Rheumatic fever     around age 11    • Rheumatic heart disease    • Snoring    • Urinary incontinence      Past Surgical History:   Procedure Laterality Date   • INCISION AND DRAINAGE GENERAL Left 2017    Procedure: INCISION AND DRAINAGE GENERAL GROIN WOUND;  Surgeon: Ilene Goff M.D.;  Location: SURGERY [facility];  Service:    • INCISION AND DRAINAGE GENERAL Left 2017    Procedure: INCISION AND DRAINAGE GENERAL GROIN;  Surgeon: Ilene Goff M.D.;  Location: SURGERY [facility];  Service:    • INGUINAL HERNIA REPAIR Left 2017    Procedure: INGUINAL HERNIA REPAIR-OPEN;  Surgeon: Ilene Goff M.D.;  Location: SURGERY SAME DAY [facility];  Service:    • SHOULDER ARTHROPLASTY TOTAL REVERSED Left 2016    Procedure: LEFT REVERSE TOTAL SHOULDER ;  Surgeon: Cal Vera M.D.;  Location: SURGERY [facility];  Service:    • SHOULDER ARTHROPLASTY TOTAL REVERSED  2013    Performed by Cal Vera M.D. at SURGERY [facility]   • LUMBAR LAMINECTOMY DISKECTOMY     • OTHER ORTHOPEDIC SURGERY  1996    back- for bone spur   • HIP ARTHROPLASTY TOTAL Left 2010, 2010    revision  right after    • HYSTERECTOMY, VAGINAL      subtotal   • KNEE ARTHROPLASTY TOTAL Right ?     Family History   Problem Relation Age of Onset   • Arthritis Mother    • Cancer Mother    • Diabetes Mother    • Heart Failure Mother    • GI Mother    • Heart Attack Mother 62        MI at 62   • Heart Disease Mother         Pacemaker   • Hypertension Mother    • Osteoporosis Mother    • Arthritis Father    • Cancer Father    • Heart Failure Father    • Heart Attack Father          of MI at 62   • Heart Disease Father    • Cancer Sister    • Diabetes Sister    • Heart Failure Sister    • Heart Attack Sister          of MI at 70   • Cancer Brother    • Heart Failure Brother    • Heart Attack Brother           of MI at 69   • Cancer Sister    • Cancer Sister      Social History     Social History   • Marital status:      Spouse name: N/A   • Number of children: N/A   • Years of education: N/A     Occupational History   • Not on file.     Social History Main Topics   • Smoking status: Current Some Day Smoker     Packs/day: 0.50     Years: 40.00     Types: Cigarettes   • Smokeless tobacco: Never Used      Comment: also smokes e cigarettes   • Alcohol use No   • Drug use: No   • Sexual activity: No     Other Topics Concern   • Not on file     Social History Narrative   • No narrative on file     Allergies   Allergen Reactions   • Contrave [Naltrexone-Bupropion Hcl Er] Diarrhea and Unspecified     Shaking, rigidity       Current Outpatient Prescriptions   Medication Sig Dispense Refill   • baclofen (LIORESAL) 10 MG Tab Take 10 mg by mouth 3 times a day.     • spironolactone (ALDACTONE) 25 MG Tab Take 1 Tab by mouth every day. (Patient taking differently: Take 25 mg by mouth as needed.) 30 Tab 11   • metoprolol (LOPRESSOR) 25 MG Tab Take 1 Tab by mouth 2 Times a Day. 60 Tab 0   • atorvastatin (LIPITOR) 20 MG Tab Take 1 Tab by mouth every bedtime. 30 Tab 0   • aspirin (ASA) 81 MG Chew Tab chewable tablet Take 1 Tab by mouth every day. 100 Tab 0   • lisinopril (PRINIVIL) 20 MG Tab Take 1 Tab by mouth every day. 30 Tab 0   • Ascorbic Acid (VITAMIN C PO) Take 1 Tab by mouth every day.     • oxyCODONE CR (OXYCONTIN) 40 MG Tablet Extended Release 12 hour Abuse-Deterrent tablet Take 40 mg by mouth 3 times a day. Indications: Chronic Pain     • Cholecalciferol (VITAMIN D) 2000 UNITS Cap Take 1 Cap by mouth every day.     • Oxycodone-Acetaminophen (PERCOCET-10)  MG TABS Take 1-2 Tabs by mouth every four hours as needed (For breakthrough pain).       No current facility-administered medications for this visit.        ROS  All others systems reviewed and negative.     Objective:     /70 (BP Location: Left arm,  "Patient Position: Sitting)   Pulse 70   Ht 1.727 m (5' 8\")   Wt 112 kg (247 lb)   SpO2 (!) 86%  Body mass index is 37.56 kg/m².    Physical Exam   General: No acute distress. Well nourished.  HEENT: EOM grossly intact, no scleral icterus, no pharyngeal erythema.   Neck:  No JVD, no bruits, trachea midline  CVS: RRR. Normal S1, S2. 3/6 mid to late peaking sys murmur R/LSB, Trace LE edema.  2+ radial pulses, 2+ DP pulses  Resp: CTAB. No wheezing or crackles/rhonchi. Normal respiratory effort.  Abdomen: Soft, NT, no adele hepatomegaly.  MSK/Ext: No clubbing or cyanosis.  Skin: Warm and dry, no rashes.  Neurological: CN III-XII grossly intact. No focal deficits. Walks with cane.  Psych: A&O x 3, appropriate affect, good judgement      Assessment:     1. Preop cardiovascular exam  EC-ECHOCARDIOGRAM COMPLETE W/O CONT    NM-CARDIAC STRESS TEST   2. Essential hypertension     3. Mixed hyperlipidemia     4. Nonrheumatic aortic valve stenosis  EC-ECHOCARDIOGRAM COMPLETE W/O CONT    NM-CARDIAC STRESS TEST   5. Other secondary pulmonary hypertension (HCC)     6. Other chest pain  EC-ECHOCARDIOGRAM COMPLETE W/O CONT    NM-CARDIAC STRESS TEST   7. Tobacco use         Medical Decision Making:  Today's Assessment / Status / Plan:     -Moderate AS 2-2018, needs new echo to ensure it has not progressed, would not be prohibitive for surgery but would better guide anesthesia.  -Moderate secon pulm HTN  -Risk factors for CAD, unable to perform 4 METS  -I recommend chemical nuclear stress test for risk stratification, would only take her for cath for moderate or high risk surgery.  -Still smoking, trying to cut back  -Dr. Shmuel Mohan at [facility] is the surgeon.  -She may need to have surgery at [facility] for cardiac safety depending on testing results    Written instructions given today:    -Echocardiogram- heart pictures to look at the heart structure and pump function and see if the moderate aortic stenosis has not " become severe  -Chemical stress test to know your risk before surgery.  -When I get the testing results, if they are not highly abnormal, I will write a letter to Dr. Mohan stating you are moderate risk for moderate risk surgery.  -You may need to have surgery at [facility] instead of [facility] for cardiac support.  -If your testing looks ago we can change your 6 month follow up to 1 year.  -Ask for Aminophylline after the stress test if you feel poorly  -You should always hear results of testing within 5 days with my interpretation, if you do not, send a CASTT message or call the office: [phone].      Return in about 6 months (around 1/3/2020), or Cam B, [facility].    It is my pleasure to participate in the care of Ms. Mcrae.  Please do not hesitate to contact me with questions or concerns.    Lula Smith MD, [facility]  Cardiologist [facility]    Please note that this dictation was created using voice recognition software. I have made every reasonable attempt to correct obvious errors, but it is possible there are errors of grammar and possibly content that I did not discover before finalizing the note.

## 2019-07-03 NOTE — LETTER
[facility]   [address] [place] [address]  Phone: [phone]  Fax: [phone]              Meghan Mcrae  1950    Encounter Date: 7/3/2019    Lula Smith M.D.          PROGRESS NOTE:  Subjective:   Chief Complaint:   Chief Complaint   Patient presents with   • Preoperative CV Eval   • Aortic Stenosis       Meghan Mcrae is a 68 y.o. female who is here for preoperative risk assessment.  She is going to have right MANSOOR with general anesthesia. Most recent ortho surgery was .    She had been reporting throat tightness, would come on at rest or activity. She was going to have a heart cath but no insurance at the time that would cover this so it was not done.    She is not limited by chest pain, pressure or tightness.   No significant dyspnea on exertion, orthopnea or lower extremity swelling.   No significant palpitations, dizziness, or presyncope/syncope.   No stroke/TIA like symptoms.  She is limited by significant hip pain, uses a cane.  Also has spine scoliosis.  Prior stress testing in , was normal.  Unable to perform at least 4 METS.    Has hypertension, blood pressure slightly elevated, takes 3 medications.  Has hyperlipidemia, LDL cholesterol 81, on Lipitor 20 mg.  Takes primary prevention aspirin.  Still smoking, has cut down.    Father  of MI at 62.  Mother had MI at 62 or so.  Sister  of MI at 70, brother at 69.    DATA REVIEWED by me:  ECG 2019  Sinus, rate 73    Echo 2018  EF 60%, mild concentric LVH, moderately dilated left atrium, moderate aortic stenosis with a mean gradient of 26, RVSP 53 mmHg.    6-minute walk test 2018  80 m.  Patient used a cane.    Most recent labs:     2019 hemoglobin 15.2, was 140, sodium 137, potassium 4.4, creatinine 0.71    2018 LDL 81, HDL 42, try glycerides 128, total cholesterol 149    2018  LFTs normal    Past Medical History:      Diagnosis Date   • Aortic stenosis    • Arthritis     scoliosis,    • Breath shortness     Uses 2l at night- [facility] in [place]   • Dental disorder     full mouth   • Heart burn    • Heart murmur    • HTN (hypertension)    • Hyperlipidemia    • Hypertension    • Indigestion    • Myocardial infarct (HCC)     age 25 due to infection   • Pain 06/2019    R hip   • Rheumatic fever     around age 11    • Rheumatic heart disease    • Snoring    • Urinary incontinence      Past Surgical History:   Procedure Laterality Date   • INCISION AND DRAINAGE GENERAL Left 8/17/2017    Procedure: INCISION AND DRAINAGE GENERAL GROIN WOUND;  Surgeon: Ilene Goff M.D.;  Location: SURGERY [facility];  Service:    • INCISION AND DRAINAGE GENERAL Left 7/20/2017    Procedure: INCISION AND DRAINAGE GENERAL GROIN;  Surgeon: Ilene Goff M.D.;  Location: SURGERY [facility];  Service:    • INGUINAL HERNIA REPAIR Left 5/2/2017    Procedure: INGUINAL HERNIA REPAIR-OPEN;  Surgeon: Ilene Goff M.D.;  Location: SURGERY SAME DAY [facility];  Service:    • SHOULDER ARTHROPLASTY TOTAL REVERSED Left 6/20/2016    Procedure: LEFT REVERSE TOTAL SHOULDER ;  Surgeon: Cal Vera M.D.;  Location: [facility];  Service:    • SHOULDER ARTHROPLASTY TOTAL REVERSED  9/19/2013    Performed by Cal Vera M.D. at SURGERY [facility]   • LUMBAR LAMINECTOMY DISKECTOMY  1999   • OTHER ORTHOPEDIC SURGERY  1996    back- for bone spur   • HIP ARTHROPLASTY TOTAL Left 01/2010, 7/2010    revision  right after    • HYSTERECTOMY, VAGINAL      subtotal   • KNEE ARTHROPLASTY TOTAL Right ?2010     Family History   Problem Relation Age of Onset   • Arthritis Mother    • Cancer Mother    • Diabetes Mother    • Heart Failure Mother    • GI Mother    • Heart Attack Mother 62        MI at 62   • Heart Disease Mother         Pacemaker   • Hypertension Mother    • Osteoporosis Mother    • Arthritis Father    • Cancer Father    • Heart  Failure Father    • Heart Attack Father          of MI at 62   • Heart Disease Father    • Cancer Sister    • Diabetes Sister    • Heart Failure Sister    • Heart Attack Sister          of MI at 70   • Cancer Brother    • Heart Failure Brother    • Heart Attack Brother          of MI at 69   • Cancer Sister    • Cancer Sister      Social History     Social History   • Marital status:      Spouse name: N/A   • Number of children: N/A   • Years of education: N/A     Occupational History   • Not on file.     Social History Main Topics   • Smoking status: Current Some Day Smoker     Packs/day: 0.50     Years: 40.00     Types: Cigarettes   • Smokeless tobacco: Never Used      Comment: also smokes e cigarettes   • Alcohol use No   • Drug use: No   • Sexual activity: No     Other Topics Concern   • Not on file     Social History Narrative   • No narrative on file     Allergies   Allergen Reactions   • Contrave [Naltrexone-Bupropion Hcl Er] Diarrhea and Unspecified     Shaking, rigidity       Current Outpatient Prescriptions   Medication Sig Dispense Refill   • baclofen (LIORESAL) 10 MG Tab Take 10 mg by mouth 3 times a day.     • spironolactone (ALDACTONE) 25 MG Tab Take 1 Tab by mouth every day. (Patient taking differently: Take 25 mg by mouth as needed.) 30 Tab 11   • metoprolol (LOPRESSOR) 25 MG Tab Take 1 Tab by mouth 2 Times a Day. 60 Tab 0   • atorvastatin (LIPITOR) 20 MG Tab Take 1 Tab by mouth every bedtime. 30 Tab 0   • aspirin (ASA) 81 MG Chew Tab chewable tablet Take 1 Tab by mouth every day. 100 Tab 0   • lisinopril (PRINIVIL) 20 MG Tab Take 1 Tab by mouth every day. 30 Tab 0   • Ascorbic Acid (VITAMIN C PO) Take 1 Tab by mouth every day.     • oxyCODONE CR (OXYCONTIN) 40 MG Tablet Extended Release 12 hour Abuse-Deterrent tablet Take 40 mg by mouth 3 times a day. Indications: Chronic Pain     • Cholecalciferol (VITAMIN D) 2000 UNITS Cap Take 1 Cap by mouth every day.     •  "Oxycodone-Acetaminophen (PERCOCET-10)  MG TABS Take 1-2 Tabs by mouth every four hours as needed (For breakthrough pain).       No current facility-administered medications for this visit.        ROS  All others systems reviewed and negative.     Objective:     /70 (BP Location: Left arm, Patient Position: Sitting)   Pulse 70   Ht 1.727 m (5' 8\")   Wt 112 kg (247 lb)   SpO2 (!) 86%  Body mass index is 37.56 kg/m².    Physical Exam   General: No acute distress. Well nourished.  HEENT: EOM grossly intact, no scleral icterus, no pharyngeal erythema.   Neck:  No JVD, no bruits, trachea midline  CVS: RRR. Normal S1, S2. 3/6 mid to late peaking sys murmur R/LSB, Trace LE edema.  2+ radial pulses, 2+ DP pulses  Resp: CTAB. No wheezing or crackles/rhonchi. Normal respiratory effort.  Abdomen: Soft, NT, no adele hepatomegaly.  MSK/Ext: No clubbing or cyanosis.  Skin: Warm and dry, no rashes.  Neurological: CN III-XII grossly intact. No focal deficits. Walks with cane.  Psych: A&O x 3, appropriate affect, good judgement      Assessment:     1. Preop cardiovascular exam  EC-ECHOCARDIOGRAM COMPLETE W/O CONT    NM-CARDIAC STRESS TEST   2. Essential hypertension     3. Mixed hyperlipidemia     4. Nonrheumatic aortic valve stenosis  EC-ECHOCARDIOGRAM COMPLETE W/O CONT    NM-CARDIAC STRESS TEST   5. Other secondary pulmonary hypertension (HCC)     6. Other chest pain  EC-ECHOCARDIOGRAM COMPLETE W/O CONT    NM-CARDIAC STRESS TEST   7. Tobacco use         Medical Decision Making:  Today's Assessment / Status / Plan:     -Moderate AS 2-2018, needs new echo to ensure it has not progressed, would not be prohibitive for surgery but would better guide anesthesia.  -Moderate secon pulm HTN  -Risk factors for CAD, unable to perform 4 METS  -I recommend chemical nuclear stress test for risk stratification, would only take her for cath for moderate or high risk surgery.  -Still smoking, trying to cut back  -Dr. Shmuel FERREIRA " Marques at [facility] is the surgeon.    Written instructions given today:    -Echocardiogram- heart pictures to look at the heart structure and pump function and see if the moderate aortic stenosis has not become severe  -Chemical stress test to know your risk before surgery.  -When I get the testing results, if they are not highly abnormal, I will write a letter to Dr. Mohan stating you are moderate risk for moderate risk surgery.  -If your testing looks ago we can change your 6 month follow up to 1 year.  -Ask for Aminophylline after the stress test if you feel poorly  -You should always hear results of testing within 5 days with my interpretation, if you do not, send a happn message or call the office: [phone].      Return in about 6 months (around 1/3/2020), or Cam [facility], [facility].    It is my pleasure to participate in the care of Ms. Mcrae.  Please do not hesitate to contact me with questions or concerns.    Lula Smith MD, [facility]  Cardiologist [facility]    Please note that this dictation was created using voice recognition software. I have made every reasonable attempt to correct obvious errors, but it is possible there are errors of grammar and possibly content that I did not discover before finalizing the note.      Jose Sheth, A.P.N.  [address] [address]  VIA Facsimile: [phone]     Shmuel Mohan M.D.  [address]  [address]  [facility] [address]  VIA Facsimile: [phone]

## 2019-07-03 NOTE — PATIENT INSTRUCTIONS
-Echocardiogram- heart pictures to look at the heart structure and pump function and see if the moderate aortic stenosis has not become severe  -Chemical stress test to know your risk before surgery.  -When I get the testing results, if they are not highly abnormal, I will write a letter to Dr. Mohan stating you are moderate risk for moderate risk surgery.  -You may need to have surgery at [facility] instead of [facility] for cardiac support.  -If your testing looks ago we can change your 6 month follow up to 1 year.  -Ask for Aminophylline after the stress test if you feel poorly  -You should always hear results of testing within 5 days with my interpretation, if you do not, send a [employer] message or call the office: [phone].

## 2019-07-17 ENCOUNTER — HOSPITAL ENCOUNTER (OUTPATIENT)
Dept: CARDIOLOGY | Facility: MEDICAL CENTER | Age: 69
End: 2019-07-17
Attending: INTERNAL MEDICINE
Payer: COMMERCIAL

## 2019-07-17 ENCOUNTER — HOSPITAL ENCOUNTER (OUTPATIENT)
Dept: RADIOLOGY | Facility: MEDICAL CENTER | Age: 69
End: 2019-07-17
Attending: INTERNAL MEDICINE
Payer: COMMERCIAL

## 2019-07-17 DIAGNOSIS — I35.0 NONRHEUMATIC AORTIC VALVE STENOSIS: ICD-10-CM

## 2019-07-17 DIAGNOSIS — Z01.810 PREOP CARDIOVASCULAR EXAM: ICD-10-CM

## 2019-07-17 DIAGNOSIS — R07.89 OTHER CHEST PAIN: ICD-10-CM

## 2019-07-17 LAB
LV EJECT FRACT  99904: 70
LV EJECT FRACT MOD 2C 99903: 77.14
LV EJECT FRACT MOD 4C 99902: 70.81
LV EJECT FRACT MOD BP 99901: 72.77

## 2019-07-17 PROCEDURE — 93306 TTE W/DOPPLER COMPLETE: CPT | Mod: 26 | Performed by: INTERNAL MEDICINE

## 2019-07-17 PROCEDURE — A9502 TC99M TETROFOSMIN: HCPCS

## 2019-07-17 PROCEDURE — 93018 CV STRESS TEST I&R ONLY: CPT | Performed by: INTERNAL MEDICINE

## 2019-07-17 PROCEDURE — 700111 HCHG RX REV CODE 636 W/ 250 OVERRIDE (IP)

## 2019-07-17 PROCEDURE — 78452 HT MUSCLE IMAGE SPECT MULT: CPT | Mod: 26 | Performed by: INTERNAL MEDICINE

## 2019-07-17 PROCEDURE — 93306 TTE W/DOPPLER COMPLETE: CPT

## 2019-07-17 RX ORDER — REGADENOSON 0.08 MG/ML
0.4 INJECTION, SOLUTION INTRAVENOUS ONCE
Status: COMPLETED | OUTPATIENT
Start: 2019-07-17 | End: 2019-07-17

## 2019-07-17 RX ORDER — REGADENOSON 0.08 MG/ML
INJECTION, SOLUTION INTRAVENOUS
Status: COMPLETED
Start: 2019-07-17 | End: 2019-07-17

## 2019-07-17 RX ADMIN — REGADENOSON 0.4 MG: 0.08 INJECTION, SOLUTION INTRAVENOUS at 09:30

## 2019-07-18 ENCOUNTER — TELEPHONE (OUTPATIENT)
Dept: CARDIOLOGY | Facility: MEDICAL CENTER | Age: 69
End: 2019-07-18

## 2019-07-18 NOTE — TELEPHONE ENCOUNTER
NM-CARDIAC STRESS TEST   Notes recorded by Lula Smith M.D. on 7/18/2019 at 9:34 AM PDT  Her nuclear stress test was normal so she can have surgery.  Her aortic stenosis is now moderate to severe, only moderate pulmonary hypertension.  I think she can have surgery but needs to have it at a heart hospital.  I think she should have hip surgery before consideration of valve replacement or TAVR.  I can write a letter stating she is moderate risk for moderate risk surgery.  Please let her know.  Thank you.     VM full at [phone]. LVM with  at [phone] requesting call back from pt.    KALYAN letter faxed to Dr. Shmuel Mohan at [facility], [phone]. Receipt confirmed.

## 2019-08-16 ENCOUNTER — HOSPITAL ENCOUNTER (OUTPATIENT)
Facility: MEDICAL CENTER | Age: 69
End: 2019-08-16
Attending: ORTHOPAEDIC SURGERY | Admitting: ORTHOPAEDIC SURGERY
Payer: COMMERCIAL

## 2019-08-27 VITALS — HEIGHT: 68 IN | BODY MASS INDEX: 37.09 KG/M2 | WEIGHT: 244.71 LBS

## 2019-08-27 DIAGNOSIS — Z01.812 PRE-OPERATIVE LABORATORY EXAMINATION: ICD-10-CM

## 2019-08-27 LAB
ANION GAP SERPL CALC-SCNC: 11 MMOL/L (ref 0–11.9)
APPEARANCE UR: CLEAR
BACTERIA #/AREA URNS HPF: ABNORMAL /HPF
BILIRUB UR QL STRIP.AUTO: NEGATIVE
BUN SERPL-MCNC: 22 MG/DL (ref 8–22)
CALCIUM SERPL-MCNC: 9.4 MG/DL (ref 8.5–10.5)
CHLORIDE SERPL-SCNC: 102 MMOL/L (ref 96–112)
CO2 SERPL-SCNC: 25 MMOL/L (ref 20–33)
COLOR UR: YELLOW
CREAT SERPL-MCNC: 1.15 MG/DL (ref 0.5–1.4)
EPI CELLS #/AREA URNS HPF: ABNORMAL /HPF
ERYTHROCYTE [DISTWIDTH] IN BLOOD BY AUTOMATED COUNT: 49 FL (ref 35.9–50)
GLUCOSE SERPL-MCNC: 100 MG/DL (ref 65–99)
GLUCOSE UR STRIP.AUTO-MCNC: NEGATIVE MG/DL
HCT VFR BLD AUTO: 48.6 % (ref 37–47)
HGB BLD-MCNC: 14.6 G/DL (ref 12–16)
HYALINE CASTS #/AREA URNS LPF: ABNORMAL /LPF
KETONES UR STRIP.AUTO-MCNC: NEGATIVE MG/DL
LEUKOCYTE ESTERASE UR QL STRIP.AUTO: ABNORMAL
MCH RBC QN AUTO: 29.1 PG (ref 27–33)
MCHC RBC AUTO-ENTMCNC: 30 G/DL (ref 33.6–35)
MCV RBC AUTO: 96.8 FL (ref 81.4–97.8)
MICRO URNS: ABNORMAL
NITRITE UR QL STRIP.AUTO: NEGATIVE
PH UR STRIP.AUTO: 6 [PH] (ref 5–8)
PLATELET # BLD AUTO: 138 K/UL (ref 164–446)
PMV BLD AUTO: 11.9 FL (ref 9–12.9)
POTASSIUM SERPL-SCNC: 4.7 MMOL/L (ref 3.6–5.5)
PROT UR QL STRIP: NEGATIVE MG/DL
RBC # BLD AUTO: 5.02 M/UL (ref 4.2–5.4)
RBC # URNS HPF: ABNORMAL /HPF
RBC UR QL AUTO: NEGATIVE
SODIUM SERPL-SCNC: 138 MMOL/L (ref 135–145)
SP GR UR STRIP.AUTO: 1.01
UROBILINOGEN UR STRIP.AUTO-MCNC: 1 MG/DL
WBC # BLD AUTO: 7.7 K/UL (ref 4.8–10.8)
WBC #/AREA URNS HPF: ABNORMAL /HPF

## 2019-08-27 PROCEDURE — 87641 MR-STAPH DNA AMP PROBE: CPT

## 2019-08-27 PROCEDURE — 87640 STAPH A DNA AMP PROBE: CPT

## 2019-08-27 PROCEDURE — 36415 COLL VENOUS BLD VENIPUNCTURE: CPT

## 2019-08-27 PROCEDURE — 80048 BASIC METABOLIC PNL TOTAL CA: CPT

## 2019-08-27 PROCEDURE — 81001 URINALYSIS AUTO W/SCOPE: CPT

## 2019-08-27 PROCEDURE — 87086 URINE CULTURE/COLONY COUNT: CPT

## 2019-08-27 PROCEDURE — 85027 COMPLETE CBC AUTOMATED: CPT

## 2019-08-27 RX ORDER — CEPHALEXIN 500 MG/1
CAPSULE ORAL
Refills: 2 | COMMUNITY
Start: 2019-07-15 | End: 2020-04-14

## 2019-08-27 RX ORDER — POTASSIUM CHLORIDE 750 MG/1
TABLET, EXTENDED RELEASE ORAL
Refills: 3 | COMMUNITY
Start: 2019-06-05 | End: 2020-04-14

## 2019-08-27 NOTE — OR NURSING
"DISCHARGE PLANNING NOTE - TOTAL JOINT    Procedure: Procedure(s):  ARTHROPLASTY, HIP, TOTAL  Procedure Date: 9/9/2019  Insurance:  [employer]/med[employer]are/etna  Equipment currently available at home? Lots of things  Steps into the home? two  Steps within the home? no  Toilet height? \"Potty chair\"  Type of shower? Step in, chair in tub.   Who will be with you during your recovery? spouse  Is Outpatient Physical Therapy set up after surgery? No   Did you take the Total Joint Class and where? no    Plan:       TOTAL JOINT REPLACEMENT SURGERY   PreAdmit Appointment  Pre-Operative Education Note       1) Did you take a Total Joint Replacement Pre-Operative Education class?  Where?  Answer: no-\"not going to drive\"    2) If you did not take a class, did you receive pre-op education in the form of a book or through an online class?    Answer: Yes    3) Have you had the same joint replacement procedure within the last 3 years?   Answer: no    For patients who answered \"No\" to the above questions:     4) Was patient given information on [facility] Pre-Op Education class through the Pre-Op Education Class flyer or the Alternative Pre-op Education flyer?   Answer: yes    5) Was a [facility] Total Joint Replacement Patient Guide binder handed out?   Answer: yes    6) Did the patient refuse preoperative education?  Answer:  no  "

## 2019-08-28 LAB
SCCMEC + MECA PNL NOSE NAA+PROBE: NEGATIVE
SCCMEC + MECA PNL NOSE NAA+PROBE: NEGATIVE

## 2019-08-29 LAB
BACTERIA UR CULT: NORMAL
SIGNIFICANT IND 70042: NORMAL
SITE SITE: NORMAL
SOURCE SOURCE: NORMAL

## 2020-01-25 ENCOUNTER — HOSPITAL ENCOUNTER (INPATIENT)
Dept: HOSPITAL 8 - ED | Age: 70
LOS: 4 days | Discharge: HOME | DRG: 91 | End: 2020-01-29
Attending: FAMILY MEDICINE | Admitting: INTERNAL MEDICINE
Payer: COMMERCIAL

## 2020-01-25 VITALS — SYSTOLIC BLOOD PRESSURE: 123 MMHG | DIASTOLIC BLOOD PRESSURE: 37 MMHG

## 2020-01-25 VITALS — HEIGHT: 66 IN | BODY MASS INDEX: 34.3 KG/M2 | WEIGHT: 213.41 LBS

## 2020-01-25 DIAGNOSIS — R57.0: ICD-10-CM

## 2020-01-25 DIAGNOSIS — G92: Primary | ICD-10-CM

## 2020-01-25 DIAGNOSIS — F17.200: ICD-10-CM

## 2020-01-25 DIAGNOSIS — F11.20: ICD-10-CM

## 2020-01-25 DIAGNOSIS — E44.0: ICD-10-CM

## 2020-01-25 DIAGNOSIS — Z79.1: ICD-10-CM

## 2020-01-25 DIAGNOSIS — J43.9: ICD-10-CM

## 2020-01-25 DIAGNOSIS — Z79.899: ICD-10-CM

## 2020-01-25 DIAGNOSIS — E66.01: ICD-10-CM

## 2020-01-25 DIAGNOSIS — Z88.0: ICD-10-CM

## 2020-01-25 DIAGNOSIS — I48.91: ICD-10-CM

## 2020-01-25 DIAGNOSIS — I47.2: ICD-10-CM

## 2020-01-25 DIAGNOSIS — J96.01: ICD-10-CM

## 2020-01-25 DIAGNOSIS — I73.9: ICD-10-CM

## 2020-01-25 DIAGNOSIS — M19.90: ICD-10-CM

## 2020-01-25 DIAGNOSIS — Z79.82: ICD-10-CM

## 2020-01-25 DIAGNOSIS — E86.0: ICD-10-CM

## 2020-01-25 DIAGNOSIS — N17.0: ICD-10-CM

## 2020-01-25 DIAGNOSIS — Z96.641: ICD-10-CM

## 2020-01-25 DIAGNOSIS — Z99.2: ICD-10-CM

## 2020-01-25 DIAGNOSIS — N39.0: ICD-10-CM

## 2020-01-25 DIAGNOSIS — E87.2: ICD-10-CM

## 2020-01-25 DIAGNOSIS — D63.8: ICD-10-CM

## 2020-01-25 DIAGNOSIS — E87.5: ICD-10-CM

## 2020-01-25 DIAGNOSIS — I10: ICD-10-CM

## 2020-01-25 DIAGNOSIS — E87.1: ICD-10-CM

## 2020-01-25 DIAGNOSIS — L03.115: ICD-10-CM

## 2020-01-25 LAB
ALBUMIN SERPL-MCNC: 3 G/DL (ref 3.4–5)
ALBUMIN SERPL-MCNC: 3.2 G/DL (ref 3.4–5)
ALP SERPL-CCNC: 103 U/L (ref 45–117)
ALT SERPL-CCNC: 19 U/L (ref 12–78)
ANION GAP SERPL CALC-SCNC: 9 MMOL/L (ref 5–15)
APTT BLD: 26 SECONDS (ref 25–31)
BASOPHILS # BLD AUTO: 0.06 X10^3/UL (ref 0–0.1)
BASOPHILS NFR BLD AUTO: 1 % (ref 0–1)
BILIRUB SERPL-MCNC: 0.3 MG/DL (ref 0.2–1)
CALCIUM SERPL-MCNC: 9.5 MG/DL (ref 8.5–10.1)
CHLORIDE SERPL-SCNC: 112 MMOL/L (ref 98–107)
CREAT SERPL-MCNC: 6.16 MG/DL (ref 0.55–1.02)
CULTURE INDICATED?: YES
EOSINOPHIL # BLD AUTO: 0 X10^3/UL (ref 0–0.4)
EOSINOPHIL NFR BLD AUTO: 0 % (ref 1–7)
ERYTHROCYTE [DISTWIDTH] IN BLOOD BY AUTOMATED COUNT: 20.8 % (ref 9.6–15.2)
INR PPP: 1.1 (ref 0.93–1.1)
LYMPHOCYTES # BLD AUTO: 0.44 X10^3/UL (ref 1–3.4)
LYMPHOCYTES NFR BLD AUTO: 5 % (ref 22–44)
MCH RBC QN AUTO: 26.5 PG (ref 27–34.8)
MCHC RBC AUTO-ENTMCNC: 31.9 G/DL (ref 32.4–35.8)
MCV RBC AUTO: 83.3 FL (ref 80–100)
MD: (no result)
MICROSCOPIC: (no result)
MONOCYTES # BLD AUTO: 0.41 X10^3/UL (ref 0.2–0.8)
MONOCYTES NFR BLD AUTO: 4 % (ref 2–9)
NEUTROPHILS # BLD AUTO: 8.46 X10^3/UL (ref 1.8–6.8)
NEUTROPHILS NFR BLD AUTO: 90 % (ref 42–75)
PLATELET # BLD AUTO: 99 X10^3/UL (ref 130–400)
PMV BLD AUTO: 10.6 FL (ref 7.4–10.4)
PROT SERPL-MCNC: 6.8 G/DL (ref 6.4–8.2)
PROTHROMBIN TIME: 11.7 SECONDS (ref 9.6–11.5)
RBC # BLD AUTO: 4.62 X10^6/UL (ref 3.82–5.3)

## 2020-01-25 PROCEDURE — 84443 ASSAY THYROID STIM HORMONE: CPT

## 2020-01-25 PROCEDURE — C1751 CATH, INF, PER/CENT/MIDLINE: HCPCS

## 2020-01-25 PROCEDURE — 87086 URINE CULTURE/COLONY COUNT: CPT

## 2020-01-25 PROCEDURE — 86704 HEP B CORE ANTIBODY TOTAL: CPT

## 2020-01-25 PROCEDURE — C1894 INTRO/SHEATH, NON-LASER: HCPCS

## 2020-01-25 PROCEDURE — 82040 ASSAY OF SERUM ALBUMIN: CPT

## 2020-01-25 PROCEDURE — 02HV33Z INSERTION OF INFUSION DEVICE INTO SUPERIOR VENA CAVA, PERCUTANEOUS APPROACH: ICD-10-PCS | Performed by: RADIOLOGY

## 2020-01-25 PROCEDURE — 80076 HEPATIC FUNCTION PANEL: CPT

## 2020-01-25 PROCEDURE — 73590 X-RAY EXAM OF LOWER LEG: CPT

## 2020-01-25 PROCEDURE — 81001 URINALYSIS AUTO W/SCOPE: CPT

## 2020-01-25 PROCEDURE — 96365 THER/PROPH/DIAG IV INF INIT: CPT

## 2020-01-25 PROCEDURE — 86706 HEP B SURFACE ANTIBODY: CPT

## 2020-01-25 PROCEDURE — 84145 PROCALCITONIN (PCT): CPT

## 2020-01-25 PROCEDURE — 82962 GLUCOSE BLOOD TEST: CPT

## 2020-01-25 PROCEDURE — 90935 HEMODIALYSIS ONE EVALUATION: CPT

## 2020-01-25 PROCEDURE — 80048 BASIC METABOLIC PNL TOTAL CA: CPT

## 2020-01-25 PROCEDURE — 85025 COMPLETE CBC W/AUTO DIFF WBC: CPT

## 2020-01-25 PROCEDURE — 85610 PROTHROMBIN TIME: CPT

## 2020-01-25 PROCEDURE — 94644 CONT INHLJ TX 1ST HOUR: CPT

## 2020-01-25 PROCEDURE — 87340 HEPATITIS B SURFACE AG IA: CPT

## 2020-01-25 PROCEDURE — 36556 INSERT NON-TUNNEL CV CATH: CPT

## 2020-01-25 PROCEDURE — 80053 COMPREHEN METABOLIC PANEL: CPT

## 2020-01-25 PROCEDURE — 5A1D70Z PERFORMANCE OF URINARY FILTRATION, INTERMITTENT, LESS THAN 6 HOURS PER DAY: ICD-10-PCS | Performed by: RADIOLOGY

## 2020-01-25 PROCEDURE — 83605 ASSAY OF LACTIC ACID: CPT

## 2020-01-25 PROCEDURE — 80307 DRUG TEST PRSMV CHEM ANLYZR: CPT

## 2020-01-25 PROCEDURE — 99292 CRITICAL CARE ADDL 30 MIN: CPT

## 2020-01-25 PROCEDURE — 85730 THROMBOPLASTIN TIME PARTIAL: CPT

## 2020-01-25 PROCEDURE — 99291 CRITICAL CARE FIRST HOUR: CPT

## 2020-01-25 PROCEDURE — 93922 UPR/L XTREMITY ART 2 LEVELS: CPT

## 2020-01-25 PROCEDURE — 80069 RENAL FUNCTION PANEL: CPT

## 2020-01-25 PROCEDURE — 87040 BLOOD CULTURE FOR BACTERIA: CPT

## 2020-01-25 PROCEDURE — 76937 US GUIDE VASCULAR ACCESS: CPT

## 2020-01-25 PROCEDURE — 83735 ASSAY OF MAGNESIUM: CPT

## 2020-01-25 PROCEDURE — P9047 ALBUMIN (HUMAN), 25%, 50ML: HCPCS

## 2020-01-25 PROCEDURE — 71045 X-RAY EXAM CHEST 1 VIEW: CPT

## 2020-01-25 PROCEDURE — 87081 CULTURE SCREEN ONLY: CPT

## 2020-01-25 PROCEDURE — 94640 AIRWAY INHALATION TREATMENT: CPT

## 2020-01-25 PROCEDURE — 93005 ELECTROCARDIOGRAM TRACING: CPT

## 2020-01-25 PROCEDURE — 77001 FLUOROGUIDE FOR VEIN DEVICE: CPT

## 2020-01-25 PROCEDURE — 96375 TX/PRO/DX INJ NEW DRUG ADDON: CPT

## 2020-01-25 PROCEDURE — 36415 COLL VENOUS BLD VENIPUNCTURE: CPT

## 2020-01-25 NOTE — NUR
DR DOOLEY IN ROOM TO PERFORM TEMPORARY DIALYSIS CATH. PROCEDURE COMPLETED AND 
XRAY IN ROOM TO CONFIRM PLACEMENT. DIALYSIS RN CONTACTED. PER DR [facility] 
TO OCCUR IN T4

## 2020-01-25 NOTE — NUR
sanchez the rn to perform dialysis stopped by. informed procedure is not complete 
yet. sanchez asked to be called back at [phone] when ready. pt resting in bed 
at this time. arousable to voice. rt in room to perform neb tx

## 2020-01-26 VITALS — SYSTOLIC BLOOD PRESSURE: 112 MMHG | DIASTOLIC BLOOD PRESSURE: 43 MMHG

## 2020-01-26 VITALS — DIASTOLIC BLOOD PRESSURE: 45 MMHG | SYSTOLIC BLOOD PRESSURE: 106 MMHG

## 2020-01-26 VITALS — SYSTOLIC BLOOD PRESSURE: 118 MMHG | DIASTOLIC BLOOD PRESSURE: 55 MMHG

## 2020-01-26 LAB
ALBUMIN SERPL-MCNC: 2.7 G/DL (ref 3.4–5)
ALP SERPL-CCNC: 83 U/L (ref 45–117)
ALT SERPL-CCNC: 20 U/L (ref 12–78)
ANION GAP SERPL CALC-SCNC: 6 MMOL/L (ref 5–15)
ANION GAP SERPL CALC-SCNC: 8 MMOL/L (ref 5–15)
BASOPHILS # BLD AUTO: 0.03 X10^3/UL (ref 0–0.1)
BASOPHILS # BLD AUTO: 0.04 X10^3/UL (ref 0–0.1)
BASOPHILS NFR BLD AUTO: 0 % (ref 0–1)
BASOPHILS NFR BLD AUTO: 1 % (ref 0–1)
BILIRUB SERPL-MCNC: 0.5 MG/DL (ref 0.2–1)
CALCIUM SERPL-MCNC: 8.4 MG/DL (ref 8.5–10.1)
CALCIUM SERPL-MCNC: 8.4 MG/DL (ref 8.5–10.1)
CHLORIDE SERPL-SCNC: 107 MMOL/L (ref 98–107)
CHLORIDE SERPL-SCNC: 109 MMOL/L (ref 98–107)
CREAT SERPL-MCNC: 1.58 MG/DL (ref 0.55–1.02)
CREAT SERPL-MCNC: 2.76 MG/DL (ref 0.55–1.02)
CULTURE INDICATED?: YES
EOSINOPHIL # BLD AUTO: 0.01 X10^3/UL (ref 0–0.4)
EOSINOPHIL # BLD AUTO: 0.02 X10^3/UL (ref 0–0.4)
EOSINOPHIL NFR BLD AUTO: 0 % (ref 1–7)
EOSINOPHIL NFR BLD AUTO: 0 % (ref 1–7)
ERYTHROCYTE [DISTWIDTH] IN BLOOD BY AUTOMATED COUNT: 20.4 % (ref 9.6–15.2)
ERYTHROCYTE [DISTWIDTH] IN BLOOD BY AUTOMATED COUNT: 20.5 % (ref 9.6–15.2)
LYMPHOCYTES # BLD AUTO: 0.31 X10^3/UL (ref 1–3.4)
LYMPHOCYTES # BLD AUTO: 0.76 X10^3/UL (ref 1–3.4)
LYMPHOCYTES NFR BLD AUTO: 10 % (ref 22–44)
LYMPHOCYTES NFR BLD AUTO: 4 % (ref 22–44)
MCH RBC QN AUTO: 25.9 PG (ref 27–34.8)
MCH RBC QN AUTO: 26.4 PG (ref 27–34.8)
MCHC RBC AUTO-ENTMCNC: 31.7 G/DL (ref 32.4–35.8)
MCHC RBC AUTO-ENTMCNC: 32.3 G/DL (ref 32.4–35.8)
MCV RBC AUTO: 81.6 FL (ref 80–100)
MCV RBC AUTO: 81.9 FL (ref 80–100)
MD: (no result)
MD: (no result)
MICROSCOPIC: (no result)
MONOCYTES # BLD AUTO: 0.43 X10^3/UL (ref 0.2–0.8)
MONOCYTES # BLD AUTO: 0.58 X10^3/UL (ref 0.2–0.8)
MONOCYTES NFR BLD AUTO: 6 % (ref 2–9)
MONOCYTES NFR BLD AUTO: 7 % (ref 2–9)
NEUTROPHILS # BLD AUTO: 6.45 X10^3/UL (ref 1.8–6.8)
NEUTROPHILS # BLD AUTO: 6.61 X10^3/UL (ref 1.8–6.8)
NEUTROPHILS NFR BLD AUTO: 82 % (ref 42–75)
NEUTROPHILS NFR BLD AUTO: 89 % (ref 42–75)
PLATELET # BLD AUTO: 93 X10^3/UL (ref 130–400)
PLATELET # BLD AUTO: 96 X10^3/UL (ref 130–400)
PMV BLD AUTO: 10.3 FL (ref 7.4–10.4)
PMV BLD AUTO: 10.9 FL (ref 7.4–10.4)
PROT SERPL-MCNC: 5.9 G/DL (ref 6.4–8.2)
RBC # BLD AUTO: 4.06 X10^6/UL (ref 3.82–5.3)
RBC # BLD AUTO: 4.32 X10^6/UL (ref 3.82–5.3)

## 2020-01-26 RX ADMIN — AMPICILLIN SODIUM AND SULBACTAM SODIUM SCH MLS/HR: 2; 1 INJECTION, POWDER, FOR SOLUTION INTRAMUSCULAR; INTRAVENOUS at 21:43

## 2020-01-26 RX ADMIN — OXYCODONE HYDROCHLORIDE SCH MG: 15 TABLET, FILM COATED, EXTENDED RELEASE ORAL at 22:23

## 2020-01-26 RX ADMIN — AMPICILLIN SODIUM AND SULBACTAM SODIUM SCH MLS/HR: 2; 1 INJECTION, POWDER, FOR SOLUTION INTRAMUSCULAR; INTRAVENOUS at 15:13

## 2020-01-26 RX ADMIN — OXYCODONE HYDROCHLORIDE PRN MG: 5 TABLET ORAL at 21:26

## 2020-01-27 VITALS — DIASTOLIC BLOOD PRESSURE: 75 MMHG | SYSTOLIC BLOOD PRESSURE: 139 MMHG

## 2020-01-27 VITALS — SYSTOLIC BLOOD PRESSURE: 139 MMHG | DIASTOLIC BLOOD PRESSURE: 75 MMHG

## 2020-01-27 VITALS — SYSTOLIC BLOOD PRESSURE: 136 MMHG | DIASTOLIC BLOOD PRESSURE: 85 MMHG

## 2020-01-27 VITALS — SYSTOLIC BLOOD PRESSURE: 138 MMHG | DIASTOLIC BLOOD PRESSURE: 72 MMHG

## 2020-01-27 VITALS — DIASTOLIC BLOOD PRESSURE: 56 MMHG | SYSTOLIC BLOOD PRESSURE: 111 MMHG

## 2020-01-27 VITALS — DIASTOLIC BLOOD PRESSURE: 81 MMHG | SYSTOLIC BLOOD PRESSURE: 130 MMHG

## 2020-01-27 LAB
ALBUMIN SERPL-MCNC: 2.9 G/DL (ref 3.4–5)
ANION GAP SERPL CALC-SCNC: 6 MMOL/L (ref 5–15)
CALCIUM SERPL-MCNC: 8.6 MG/DL (ref 8.5–10.1)
CHLORIDE SERPL-SCNC: 110 MMOL/L (ref 98–107)
CREAT SERPL-MCNC: 0.91 MG/DL (ref 0.55–1.02)

## 2020-01-27 RX ADMIN — OXYCODONE HYDROCHLORIDE SCH MG: 15 TABLET, FILM COATED, EXTENDED RELEASE ORAL at 20:16

## 2020-01-27 RX ADMIN — OXYCODONE HYDROCHLORIDE PRN MG: 5 TABLET ORAL at 05:57

## 2020-01-27 RX ADMIN — OXYCODONE HYDROCHLORIDE PRN MG: 5 TABLET ORAL at 12:07

## 2020-01-27 RX ADMIN — ATORVASTATIN CALCIUM SCH MG: 20 TABLET, FILM COATED ORAL at 20:08

## 2020-01-27 RX ADMIN — DOXYCYCLINE HYCLATE SCH MG: 100 TABLET, FILM COATED ORAL at 20:08

## 2020-01-27 RX ADMIN — DOXYCYCLINE HYCLATE SCH MG: 100 TABLET, FILM COATED ORAL at 08:47

## 2020-01-27 RX ADMIN — OXYCODONE HYDROCHLORIDE SCH MG: 15 TABLET, FILM COATED, EXTENDED RELEASE ORAL at 08:47

## 2020-01-28 VITALS — SYSTOLIC BLOOD PRESSURE: 158 MMHG | DIASTOLIC BLOOD PRESSURE: 82 MMHG

## 2020-01-28 VITALS — SYSTOLIC BLOOD PRESSURE: 146 MMHG | DIASTOLIC BLOOD PRESSURE: 82 MMHG

## 2020-01-28 VITALS — DIASTOLIC BLOOD PRESSURE: 80 MMHG | SYSTOLIC BLOOD PRESSURE: 132 MMHG

## 2020-01-28 VITALS — DIASTOLIC BLOOD PRESSURE: 79 MMHG | SYSTOLIC BLOOD PRESSURE: 154 MMHG

## 2020-01-28 VITALS — SYSTOLIC BLOOD PRESSURE: 154 MMHG | DIASTOLIC BLOOD PRESSURE: 79 MMHG

## 2020-01-28 PROCEDURE — B548ZZA ULTRASONOGRAPHY OF SUPERIOR VENA CAVA, GUIDANCE: ICD-10-PCS | Performed by: RADIOLOGY

## 2020-01-28 RX ADMIN — HYDROCHLOROTHIAZIDE SCH MG: 25 TABLET ORAL at 09:50

## 2020-01-28 RX ADMIN — Medication SCH MG: at 09:50

## 2020-01-28 RX ADMIN — DOXYCYCLINE HYCLATE SCH MG: 100 TABLET, FILM COATED ORAL at 08:27

## 2020-01-28 RX ADMIN — DOXYCYCLINE HYCLATE SCH MG: 100 TABLET, FILM COATED ORAL at 20:47

## 2020-01-28 RX ADMIN — OXYCODONE HYDROCHLORIDE PRN MG: 5 TABLET ORAL at 10:53

## 2020-01-28 RX ADMIN — ATORVASTATIN CALCIUM SCH MG: 20 TABLET, FILM COATED ORAL at 20:47

## 2020-01-28 RX ADMIN — OXYCODONE HYDROCHLORIDE SCH MG: 15 TABLET, FILM COATED, EXTENDED RELEASE ORAL at 08:27

## 2020-01-28 RX ADMIN — OXYCODONE HYDROCHLORIDE SCH MG: 15 TABLET, FILM COATED, EXTENDED RELEASE ORAL at 20:46

## 2020-01-28 RX ADMIN — OXYCODONE HYDROCHLORIDE PRN MG: 5 TABLET ORAL at 17:12

## 2020-01-28 RX ADMIN — OXYCODONE HYDROCHLORIDE PRN MG: 5 TABLET ORAL at 04:22

## 2020-01-28 RX ADMIN — OXYCODONE HYDROCHLORIDE PRN MG: 5 TABLET ORAL at 23:58

## 2020-01-29 VITALS — SYSTOLIC BLOOD PRESSURE: 164 MMHG | DIASTOLIC BLOOD PRESSURE: 78 MMHG

## 2020-01-29 VITALS — DIASTOLIC BLOOD PRESSURE: 84 MMHG | SYSTOLIC BLOOD PRESSURE: 157 MMHG

## 2020-01-29 LAB
ALBUMIN SERPL-MCNC: 2.6 G/DL (ref 3.4–5)
ANION GAP SERPL CALC-SCNC: 3 MMOL/L (ref 5–15)
BASOPHILS # BLD AUTO: 0.01 X10^3/UL (ref 0–0.1)
BASOPHILS NFR BLD AUTO: 0 % (ref 0–1)
CALCIUM SERPL-MCNC: 8.7 MG/DL (ref 8.5–10.1)
CHLORIDE SERPL-SCNC: 109 MMOL/L (ref 98–107)
CREAT SERPL-MCNC: 0.74 MG/DL (ref 0.55–1.02)
EOSINOPHIL # BLD AUTO: 0.34 X10^3/UL (ref 0–0.4)
EOSINOPHIL NFR BLD AUTO: 4 % (ref 1–7)
ERYTHROCYTE [DISTWIDTH] IN BLOOD BY AUTOMATED COUNT: 20.3 % (ref 9.6–15.2)
LYMPHOCYTES # BLD AUTO: 0.92 X10^3/UL (ref 1–3.4)
LYMPHOCYTES NFR BLD AUTO: 10 % (ref 22–44)
MCH RBC QN AUTO: 26.3 PG (ref 27–34.8)
MCHC RBC AUTO-ENTMCNC: 31.5 G/DL (ref 32.4–35.8)
MCV RBC AUTO: 83.3 FL (ref 80–100)
MD: NO
MONOCYTES # BLD AUTO: 0.65 X10^3/UL (ref 0.2–0.8)
MONOCYTES NFR BLD AUTO: 7 % (ref 2–9)
NEUTROPHILS # BLD AUTO: 7.16 X10^3/UL (ref 1.8–6.8)
NEUTROPHILS NFR BLD AUTO: 79 % (ref 42–75)
PLATELET # BLD AUTO: 102 X10^3/UL (ref 130–400)
PMV BLD AUTO: 10 FL (ref 7.4–10.4)
RBC # BLD AUTO: 4.2 X10^6/UL (ref 3.82–5.3)

## 2020-01-29 RX ADMIN — HYDROCHLOROTHIAZIDE SCH MG: 25 TABLET ORAL at 09:14

## 2020-01-29 RX ADMIN — OXYCODONE HYDROCHLORIDE PRN MG: 5 TABLET ORAL at 14:59

## 2020-01-29 RX ADMIN — OXYCODONE HYDROCHLORIDE PRN MG: 5 TABLET ORAL at 05:35

## 2020-01-29 RX ADMIN — OXYCODONE HYDROCHLORIDE SCH MG: 15 TABLET, FILM COATED, EXTENDED RELEASE ORAL at 09:14

## 2020-01-29 RX ADMIN — DOXYCYCLINE HYCLATE SCH MG: 100 TABLET, FILM COATED ORAL at 09:14

## 2020-01-29 RX ADMIN — Medication SCH MG: at 05:36

## 2020-04-14 ENCOUNTER — NON-PROVIDER VISIT (OUTPATIENT)
Dept: WOUND CARE | Facility: MEDICAL CENTER | Age: 70
End: 2020-04-14
Attending: NURSE PRACTITIONER
Payer: COMMERCIAL

## 2020-04-14 PROCEDURE — 99211 OFF/OP EST MAY X REQ PHY/QHP: CPT

## 2020-04-14 PROCEDURE — 97602 WOUND(S) CARE NON-SELECTIVE: CPT

## 2020-04-14 RX ORDER — CELECOXIB 200 MG/1
200 CAPSULE ORAL 2 TIMES DAILY
COMMUNITY

## 2020-04-14 NOTE — CERTIFICATION
July 1, 2019    To Whom It May Concern:         This is confirmation that Chinyere Chikiyossi Cem attended her scheduled appointment with DANIELLE Lam on 7/01/19. She may return to work July 1, 2019 without limitation.          If you have any questions please do not hesitate to call me at the phone number listed below.    Sincerely,          DAISY Lam.  [phone]                 "Non Provider Encounter- Lower Extremity Ulcer    HISTORY OF PRESENT ILLNESS  Wound History:    START OF CARE IN CLINIC: 04/14/2020    REFERRING PROVIDER: Jose OLGUIN     WOUND ETIOLOGY: venous   LOCATION: R distal LE circumferentially    HISTORY: Pt is a 69 year old lady who lives near [facility], who reports she underwent a R MANSOOR in October 2019, and had severe edema of the RLE post surgery. This did not go away, ans she said she had \"a scan where they put a pressure cuff on my ankle\" (presumably a venous duplex scan) in January 2020, which she says caused her leg to blister and break open. She has been treating the wounds at home with adaptic, hydrogel and roll gauze.     Pertinent Medical History: arthritis, HTN    ETIOLOGY HISTORY: Diagnosed no. Vascular Surgeon: Sees Dr. Hall today 04/14/2020 after this appt. Previous vascular procedures pt reports having \"vein stripped years ago\". Compression tubigrip. Varicose Veins yes        TOBACCO USE: Cigarettes 1/2 pack day x 50 years, quit Jan 2020    FALL RISK ASSESSMENT:    X65 years or older     Fall within the last 2 years   XUses ambulatory devices  - 2 canes  Loss of protective sensation in feet   Use of prostethic/orthotic    Presence of lower extremity/foot/toe amputation   XTaking medication that increases risk (per facility policy)    Interventions Recommended (if any of the above are selected):   Use of Assistive Device: 2 canes   Supervision with ambulation: Caregiver   Assistance with ambulation: Caregiver   Home safety education: Educational material provided    MOST RECENT VASCULAR STUDIES: none in chart - pt sees Dr Hall (vascular) today    Clinic OWEN: Not done due to location of wound/pain      PAST MEDICAL HISTORY:   Past Medical History:   Diagnosis Date   • Aortic stenosis    • Arthritis     scoliosis,    • Breath shortness     Uses 2l at night- [facility] in [place]   • Dental disorder     full mouth   • Heart burn    • " Heart murmur    • High cholesterol    • HTN (hypertension)    • Hyperlipidemia    • Hypertension    • Indigestion    • Myocardial infarct (HCC)     age 25 due to infection   • Pain 08/2019    R hip, back, arthritis   • Rheumatic fever     around age 11    • Rheumatic heart disease    • Snoring    • Urinary incontinence        PAST SURGICAL HISTORY:   Past Surgical History:   Procedure Laterality Date   • INCISION AND DRAINAGE GENERAL Left 8/17/2017    Procedure: INCISION AND DRAINAGE GENERAL GROIN WOUND;  Surgeon: Ilene Goff M.D.;  Location: SURGERY [facility];  Service:    • INCISION AND DRAINAGE GENERAL Left 7/20/2017    Procedure: INCISION AND DRAINAGE GENERAL GROIN;  Surgeon: Ilene Goff M.D.;  Location: SURGERY [facility];  Service:    • INGUINAL HERNIA REPAIR Left 5/2/2017    Procedure: INGUINAL HERNIA REPAIR-OPEN;  Surgeon: Ilene Goff M.D.;  Location: SURGERY SAME DAY [facility];  Service:    • SHOULDER ARTHROPLASTY TOTAL REVERSED Left 6/20/2016    Procedure: LEFT REVERSE TOTAL SHOULDER ;  Surgeon: Cal Vera M.D.;  Location: SURGERY [facility];  Service:    • SHOULDER ARTHROPLASTY TOTAL REVERSED  9/19/2013    Performed by Cal Vera M.D. at SURGERY [facility]   • LUMBAR LAMINECTOMY DISKECTOMY  1999   • OTHER ORTHOPEDIC SURGERY  1996    back- for bone spur   • HIP ARTHROPLASTY TOTAL Left 01/2010, 7/2010    revision  right after    • HYSTERECTOMY, VAGINAL      subtotal   • KNEE ARTHROPLASTY TOTAL Right ?2010        MEDICATIONS:   Current Outpatient Medications:   •  celecoxib (CELEBREX) 200 MG Cap, Take 200 mg by mouth 2 times a day. Indications: Arthritis, Disp: , Rfl:   •  baclofen (LIORESAL) 10 MG Tab, Take 10 mg by mouth 3 times a day., Disp: , Rfl:   •  spironolactone (ALDACTONE) 25 MG Tab, Take 1 Tab by mouth every day. (Patient taking differently: Take 25 mg by mouth as needed.), Disp: 30 Tab, Rfl: 11  •  metoprolol (LOPRESSOR) 25 MG Tab, Take 1 Tab by mouth 2  Times a Day., Disp: 60 Tab, Rfl: 0  •  atorvastatin (LIPITOR) 20 MG Tab, Take 1 Tab by mouth every bedtime., Disp: 30 Tab, Rfl: 0  •  aspirin (ASA) 81 MG Chew Tab chewable tablet, Take 1 Tab by mouth every day., Disp: 100 Tab, Rfl: 0  •  lisinopril (PRINIVIL) 20 MG Tab, Take 1 Tab by mouth every day., Disp: 30 Tab, Rfl: 0  •  Ascorbic Acid (VITAMIN C PO), Take 1 Tab by mouth every day., Disp: , Rfl:   •  oxyCODONE CR (OXYCONTIN) 40 MG Tablet Extended Release 12 hour Abuse-Deterrent tablet, Take 40 mg by mouth 3 times a day. Indications: Chronic Pain, Disp: , Rfl:   •  Cholecalciferol (VITAMIN D) 2000 UNITS Cap, Take 1 Cap by mouth every day., Disp: , Rfl:   •  Oxycodone-Acetaminophen (PERCOCET-10)  MG TABS, Take 1-2 Tabs by mouth every four hours as needed (For breakthrough pain)., Disp: , Rfl:         ALLERGIES:    Allergies   Allergen Reactions   • Contrave [Naltrexone-Bupropion Hcl Er] Diarrhea and Unspecified     Shaking, rigidity         SOCIAL HISTORY:   Social History     Socioeconomic History   • Marital status:      Spouse name: Not on file   • Number of children: Not on file   • Years of education: Not on file   • Highest education level: Not on file   Occupational History   • Not on file   Social Needs   • Financial resource strain: Not on file   • Food insecurity     Worry: Not on file     Inability: Not on file   • Transportation needs     Medical: Not on file     Non-medical: Not on file   Tobacco Use   • Smoking status: Current Some Day Smoker     Packs/day: 0.25     Years: 40.00     Pack years: 10.00     Types: Cigarettes   • Smokeless tobacco: Never Used   • Tobacco comment: also smokes e cigarettes   Substance and Sexual Activity   • Alcohol use: No   • Drug use: No   • Sexual activity: Never     Partners: Male   Lifestyle   • Physical activity     Days per week: Not on file     Minutes per session: Not on file   • Stress: Not on file   Relationships   • Social connections     Talks on  phone: Not on file     Gets together: Not on file     Attends [employer] service: Not on file     Active member of club or organization: Not on file     Attends meetings of clubs or organizations: Not on file     Relationship status: Not on file   • Intimate partner violence     Fear of current or ex partner: Not on file     Emotionally abused: Not on file     Physically abused: Not on file     Forced sexual activity: Not on file   Other Topics Concern   • Not on file   Social History Narrative   • Not on file       FAMILY HISTORY:   Family History   Problem Relation Age of Onset   • Arthritis Mother    • Cancer Mother    • Diabetes Mother    • Heart Failure Mother    • GI Disease Mother    • Heart Attack Mother 62        MI at 62   • Heart Disease Mother         Pacemaker   • Hypertension Mother    • Osteoporosis Mother    • Arthritis Father    • Cancer Father    • Heart Failure Father    • Heart Attack Father          of MI at 62   • Heart Disease Father    • Cancer Sister    • Diabetes Sister    • Heart Failure Sister    • Heart Attack Sister          of MI at 70   • Cancer Brother    • Heart Failure Brother    • Heart Attack Brother          of MI at 69   • Cancer Sister    • Cancer Sister        WOUND ASSESSMENT       Wound 20 Venous Ulcer Leg Circumferential;Lower Right RLE distally, circumferential CVI ulcers (Active)   Wound Image     2020 11:00 AM   Site Assessment Pink;Red 2020 11:00 AM   Periwound Assessment Intact;Hemosiderin Staining;Blanchable erythema;Hyperpigmented 2020 11:00 AM   Margins Attached edges 2020 11:00 AM   Closure Secondary intention 2020 11:00 AM   Drainage Amount Small 2020 11:00 AM   Drainage Description Serosanguineous 2020 11:00 AM   Treatments Cleansed 2020 11:00 AM   Wound Cleansing Approved Wound Cleanser 2020 11:00 AM   Periwound Protectant Skin Protectant Wipes to Periwound 2020 11:00 AM   Dressing  Cleansing/Solutions Normal Saline 4/14/2020 11:00 AM   Dressing Options Absorbent Abdominal Pad;Dry Roll Gauze;Petroleum Gauze (clear);Telfa;Tubigrip 4/14/2020 11:00 AM   Dressing Changed New 4/14/2020 11:00 AM   Dressing Status Clean;Dry;Intact 4/14/2020 11:00 AM   Dressing Change/Treatment Frequency Every 48 hrs, and As Needed 4/14/2020 11:00 AM   Wound Length (cm) 27 cm 4/14/2020 11:00 AM   Wound Width (cm) 11 cm 4/14/2020 11:00 AM   Wound Depth (cm) 0.1 cm 4/14/2020 11:00 AM   Wound Surface Area (cm^2) 297 cm^2 4/14/2020 11:00 AM   Wound Volume (cm^3) 29.7 cm^3 4/14/2020 11:00 AM   Wound Bed Granulation (%) 100 % 4/14/2020 11:00 AM   Wound Odor None 4/14/2020 11:00 AM   Pulses 1+;DP;PT;Right;Doppler;Other (Comments) 4/14/2020 11:00 AM   Exposed Structures None 4/14/2020 11:00 AM          PATIENT EDUCATION -  VENOUS  - Etiology of venous ulceration discussed with patient  - Importance of managing edema for healing of ulcer, and for prevention of new ulcer development  -Need for lifelong compression of lower legs   -Elevate legs above the level of the heart periodically throughout the day.  - Importance of adequate nutrition for wound healing  -Increase protein intake (unless contraindicated by renal status)  -Discussed importance of smoking cessation.  -Advised to go to ER for any increased redness, swelling, drainage or odor, or if patient develops fever, chills, nausea or vomiting.

## 2020-04-24 ENCOUNTER — NON-PROVIDER VISIT (OUTPATIENT)
Dept: WOUND CARE | Facility: MEDICAL CENTER | Age: 70
End: 2020-04-24
Attending: NURSE PRACTITIONER
Payer: COMMERCIAL

## 2020-04-24 PROCEDURE — 29580 STRAPPING UNNA BOOT: CPT

## 2020-04-24 NOTE — PROCEDURES
Wound Care Procedures 4/24/2020:  Unna Boot applied to right lower extremity.    Patient states that she had an ultrasound of her right leg at Dr. Aurea Hall's office on 4/14/2020, but did not see Dr. Hall. She states that the ultrasound tech told her that she would be seeing Dr. Hall at her next wound care appointment to go over results.     Advised patient that Dr. Hall is only in this clinic on select Mondays. Patient scheduled to see Dr. Hall in this clinic on 5/11/2020.

## 2020-04-24 NOTE — PATIENT INSTRUCTIONS
-Keep dressings clean, dry and covered while bathing. Only change dressings if they become over saturated, soiled or fall off.     -Avoid prolonged standing or sitting without elevating your legs.    -Remove your compression garments if you have severe pain, severe swelling, numbness, color change, or temperature change in your toes. If you need to remove your compression garments, do so by unrolling them. Do not cut the compression garments off, this is to prevent cutting yourself on accident.    -Should you experience any significant changes in your wound(s), such as infection (redness, swelling, localized heat, increased pain, fever > 101 F, chills) or have any questions regarding your home care instructions, please contact the wound center at [phone]. If after hours, contact your primary care physician or go to the hospital emergency room.

## 2020-05-01 ENCOUNTER — OFFICE VISIT (OUTPATIENT)
Dept: WOUND CARE | Facility: MEDICAL CENTER | Age: 70
End: 2020-05-01
Attending: NURSE PRACTITIONER
Payer: COMMERCIAL

## 2020-05-01 VITALS
SYSTOLIC BLOOD PRESSURE: 135 MMHG | OXYGEN SATURATION: 95 % | TEMPERATURE: 98.5 F | RESPIRATION RATE: 18 BRPM | DIASTOLIC BLOOD PRESSURE: 64 MMHG | HEART RATE: 72 BPM

## 2020-05-01 DIAGNOSIS — L03.115 CELLULITIS OF RIGHT LOWER EXTREMITY: ICD-10-CM

## 2020-05-01 DIAGNOSIS — E66.09 CLASS 2 OBESITY DUE TO EXCESS CALORIES WITH BODY MASS INDEX (BMI) OF 35.0 TO 35.9 IN ADULT, UNSPECIFIED WHETHER SERIOUS COMORBIDITY PRESENT: ICD-10-CM

## 2020-05-01 DIAGNOSIS — S81.801D OPEN WOUND OF RIGHT LOWER LEG, SUBSEQUENT ENCOUNTER: ICD-10-CM

## 2020-05-01 PROCEDURE — 99213 OFFICE O/P EST LOW 20 MIN: CPT

## 2020-05-01 PROCEDURE — 99213 OFFICE O/P EST LOW 20 MIN: CPT | Performed by: NURSE PRACTITIONER

## 2020-05-01 RX ORDER — DOXYCYCLINE HYCLATE 100 MG
100 TABLET ORAL 2 TIMES DAILY
Qty: 14 TAB | Refills: 0 | Status: SHIPPED | OUTPATIENT
Start: 2020-05-01 | End: 2020-05-08

## 2020-05-01 NOTE — PATIENT INSTRUCTIONS
Avoid prolonged standing or sitting without elevating your legs.  - Multilayer compression wrap (Unna's boot) to R leg. Do not get wet and keep on for the week. Only remove if temperature or sensation changes.   If compression needs to be removed, un-wrap it do not cut it off.     Should you experience any significant changes in your wound(s), such as infection (redness, swelling, localized heat, increased pain, fever > 101 F, chills) or have any questions regarding your home care instructions, please contact the wound center at [phone]. If after hours, contact your primary care physician or go to the hospital emergency room.   Keep dressing clean, dry and covered while bathing. Only change dressing if it becomes over saturated, soiled or falls off.

## 2020-05-01 NOTE — NON-PROVIDER
I forgot to measure pt for compression stockings today; all [employer] order paperwork is prepared - please measure LE on next appt 05/10/2020. Thank you.

## 2020-05-01 NOTE — PROGRESS NOTES
" Provider Encounter- Lower Extremity Ulcer      HISTORY OF PRESENT ILLNESS  Wound History:               START OF CARE IN CLINIC: 04/14/2020               REFERRING PROVIDER: Jose OLGUIN                 WOUND ETIOLOGY: venous              LOCATION: R distal LE circumferentially               HISTORY: Pt is a 69 year old lady who lives near [facility], who reports she underwent a R MANSOOR in October 2019, and had severe edema of the RLE post surgery. This did not go away, ans she said she had \"a scan where they put a pressure cuff on my ankle\" (presumably a venous duplex scan) in January 2020, which she says caused her leg to blister and break open. She was treating the wounds at home with adaptic, hydrogel and roll gauze.   Referred to [facility] by her PCP.     Pertinent Medical History: arthritis, HTN               ETIOLOGY HISTORY: Diagnosed no. Vascular Surgeon:  None, scheduled to see Dr. Aurea Hall in clinic on 5/11/2020 previous vascular procedures pt reports having \"vein stripped years ago\". Compression tubigrip. Varicose Veins yes        TOBACCO USE: Cigarettes 1/2 pack day x 50 years, quit Jan 2020    Patient's problem list, allergies, and current medications reviewed and updated in Epic    Interval History:  5/1/2020: Initial provider visit with FRANDY Valentino.  Patient presents today with only a small open wound remaining.  We did discuss the need for lifelong compression in order to prevent recurrence.  She was measured for compression stockings in clinic today, these will be ordered through prism.  She does present with some increased erythema to her lower leg, for which doxycycline was ordered.  She has a vascular consult appoint with Dr. Hall in the clinic on 5/11.         REVIEW OF SYSTEMS:   Review of Systems   Constitutional: Negative for chills and fever.   Respiratory: Negative for cough and shortness of breath.    Cardiovascular: Positive for leg swelling. Negative for chest " pain and claudication.        Problems with legs swelling for several years   Gastrointestinal: Negative for constipation, diarrhea, nausea and vomiting.   Genitourinary: Negative for dysuria.   Musculoskeletal: Positive for joint pain.   Skin: Negative for rash.   Psychiatric/Behavioral: Negative for depression. The patient is not nervous/anxious.          PHYSICAL EXAMINATION:   There were no vitals taken for this visit.    Physical Exam   Constitutional: She is oriented to person, place, and time and well-developed, well-nourished, and in no distress.   Obese   HENT:   Head: Normocephalic.   Eyes: Pupils are equal, round, and reactive to light.   Cardiovascular: Intact distal pulses.   Pulmonary/Chest: Effort normal.   Musculoskeletal:         General: Edema present.      Comments: Nonpitting edema bilateral lower extremities   Neurological: She is alert and oriented to person, place, and time.   Skin: Skin is warm.   Shallow open wound to right lateral lower extremity.  Hemosiderin staining to gaiter area of both lower legs  Further wound flowsheet photos   Psychiatric: Mood and affect normal.       WOUND ASSESSMENT         Wound 04/24/20 Venous Ulcer Leg Lateral Right -Right Lateral LE (Active)   Wound Image   5/1/2020  1:00 PM   Site Assessment [place] 5/1/2020  1:00 PM   Periwound Assessment Edema 5/1/2020  1:00 PM   Margins Attached edges 5/1/2020  1:00 PM   Closure Secondary intention 5/1/2020  1:00 PM   Drainage Amount Moderate 5/1/2020  1:00 PM   Drainage Description Serous 5/1/2020  1:00 PM   Treatments Cleansed 5/1/2020  1:00 PM   Wound Cleansing Foam Cleanser/Washcloth 5/1/2020  1:00 PM   Periwound Protectant Barrier Paste 5/1/2020  1:00 PM   Dressing Cleansing/Solutions Normal Saline 5/1/2020  1:00 PM   Dressing Options Unna Boot 5/1/2020  1:00 PM   Dressing Changed Changed 5/1/2020  1:00 PM   Dressing Status Clean;Dry;Intact 5/1/2020  1:00 PM   Dressing Change/Treatment Frequency Weekly, and As Needed  5/1/2020  1:00 PM   Wound Length (cm) 0.2 cm 5/1/2020  1:00 PM   Wound Width (cm) 0.3 cm 5/1/2020  1:00 PM   Wound Depth (cm) 0.1 cm 5/1/2020  1:00 PM   Wound Surface Area (cm^2) 0.06 cm^2 5/1/2020  1:00 PM   Wound Volume (cm^3) 0.01 cm^3 5/1/2020  1:00 PM   Wound Bed Granulation (%) 100 % 4/24/2020 11:15 AM   Wound Bed Epithelium (%) 0 % 4/24/2020 11:15 AM   Wound Bed Slough (%) 0 % 4/24/2020 11:15 AM   Wound Bed Eschar (%) 0 % 4/24/2020 11:15 AM   Tunneling (cm) 0 cm 4/24/2020 11:15 AM   Undermining (cm) 0 cm 4/24/2020 11:15 AM   Wound Odor None 5/1/2020  1:00 PM   Pulses DP;1+ 4/24/2020 11:15 AM   Exposed Structures None 5/1/2020  1:00 PM       Wound 04/24/20 Venous Ulcer Leg Left -Right Medial LE (Active)   Wound Image   5/1/2020  1:00 PM   Site Assessment Epithelialization 5/1/2020  1:00 PM   Periwound Assessment Edema 4/24/2020 11:15 AM   Margins Attached edges 4/24/2020 11:15 AM   Drainage Amount Moderate 4/24/2020 11:15 AM   Drainage Description Serous 4/24/2020 11:15 AM   Treatments Cleansed 4/24/2020 11:15 AM   Wound Cleansing Foam Cleanser/Washcloth 4/24/2020 11:15 AM   Periwound Protectant Barrier Paste 4/24/2020 11:15 AM   Dressing Options Hydrofiber;Unna Boot 4/24/2020 11:15 AM   Dressing Changed New 4/24/2020 11:15 AM   Dressing Change/Treatment Frequency Weekly, and As Needed 4/24/2020 11:15 AM   Wound Length (cm) 4 cm 4/24/2020 11:15 AM   Wound Width (cm) 4.2 cm 4/24/2020 11:15 AM   Wound Depth (cm) 0 cm 4/24/2020 11:15 AM   Wound Surface Area (cm^2) 16.8 cm^2 4/24/2020 11:15 AM   Wound Volume (cm^3) 0 cm^3 4/24/2020 11:15 AM   Wound Bed Granulation (%) 100 % 4/24/2020 11:15 AM   Wound Bed Epithelium (%) 0 % 4/24/2020 11:15 AM   Wound Bed Slough (%) 0 % 4/24/2020 11:15 AM   Wound Bed Eschar (%) 0 % 4/24/2020 11:15 AM   Tunneling (cm) 0 cm 4/24/2020 11:15 AM   Undermining (cm) 0 cm 4/24/2020 11:15 AM   Wound Odor None 4/24/2020 11:15 AM   Pulses DP;1+ 4/24/2020 11:15 AM   Exposed Structures None  4/24/2020 11:15 AM     Right lateral lower extremity wound, nearly resolved    Right medial lower extremity wound, resolved           PROCEDURE:   - no need for debridement today  -Wound RN to measure patient for compression stockings, place order through [facility]  -Wound care completed by wound RN, refer to wound flowsheet   -Patient tolerated the procedure well, without c/o of significant pain or discomfort.       Pertinent Labs and Diagnostics:    Labs: N/A    IMAGING: None/NA    VASCULAR STUDIES: None found in epic    LAST  WOUND CULTURE: None found in epic      ASSESSMENT AND PLAN:     1. Open wound of right lower leg, subsequent encounter  Comments: Episode of right lower extremity edema with blistering in October 2019.  Blisters were ruptured with vascular study, resulting in open wounds which did not heal.    5/1/2020: Initial provider visit.  Patient's wounds are nearly resolved, no need for debridement  -RN instructed to measure patient's leg for compression stocking  -Patient has vascular evaluation with Dr. Aurea Hall in clinic on 5/11    Wound care: Unna's boot placed to manage edema and promote healing of residual wound.  Patient instructed to remove after 1 week, should have her compression stocking by then.    2. Cellulitis of right lower extremity    5/1/2020: Patient presents today with increased erythema to lower extremity.  Denies fevers, chills, nausea, vomiting.  -Rx for doxycycline sent to patient's pharmacy for presumptive cellulitis    3. Class 2 obesity due to excess calories with body mass index (BMI) of 35.0 to 35.9 in adult, unspecified whether serious comorbidity present  Comments: Complicating factor, increased risk for CVI            PATIENT EDUCATION  - Etiology of  venous stasis ulceration discussed with patient  - Importance of managing edema for healing of ulcer, and for prevention of new ulcer development  -Need for lifelong compression of lower legs   -Elevate legs above the  level of the heart periodically throughout the day.      15 min spent face to face with patient, >50% of time spent counseling, coordinating care, reviewing records, discussing POC, educating patient regarding vascular disease, wound healing and progression. This time was spent in excess to procedure time.       Please note that this note may have been created using voice recognition software. I have worked with technical experts from [facility] to optimize the interface.  I have made every reasonable attempt to correct obvious errors, but there may be errors of grammar and possibly     N

## 2020-05-02 ASSESSMENT — ENCOUNTER SYMPTOMS
SHORTNESS OF BREATH: 0
DEPRESSION: 0
NAUSEA: 0
DIARRHEA: 0
CHILLS: 0
COUGH: 0
VOMITING: 0
CONSTIPATION: 0
CLAUDICATION: 0
FEVER: 0
NERVOUS/ANXIOUS: 0

## 2020-05-08 ENCOUNTER — APPOINTMENT (OUTPATIENT)
Dept: WOUND CARE | Facility: MEDICAL CENTER | Age: 70
End: 2020-05-08
Attending: NURSE PRACTITIONER
Payer: COMMERCIAL

## 2020-05-11 ENCOUNTER — OFFICE VISIT (OUTPATIENT)
Dept: WOUND CARE | Facility: MEDICAL CENTER | Age: 70
End: 2020-05-11
Attending: NURSE PRACTITIONER
Payer: COMMERCIAL

## 2020-05-11 VITALS
DIASTOLIC BLOOD PRESSURE: 67 MMHG | SYSTOLIC BLOOD PRESSURE: 138 MMHG | RESPIRATION RATE: 16 BRPM | OXYGEN SATURATION: 92 % | TEMPERATURE: 98.2 F | HEART RATE: 62 BPM

## 2020-05-11 DIAGNOSIS — E66.09 CLASS 2 OBESITY DUE TO EXCESS CALORIES WITH BODY MASS INDEX (BMI) OF 35.0 TO 35.9 IN ADULT, UNSPECIFIED WHETHER SERIOUS COMORBIDITY PRESENT: ICD-10-CM

## 2020-05-11 DIAGNOSIS — S81.801D OPEN WOUND OF RIGHT LOWER LEG, SUBSEQUENT ENCOUNTER: ICD-10-CM

## 2020-05-11 PROCEDURE — 99213 OFFICE O/P EST LOW 20 MIN: CPT

## 2020-05-11 ASSESSMENT — ENCOUNTER SYMPTOMS
DEPRESSION: 0
CLAUDICATION: 0
VOMITING: 0
CONSTIPATION: 0
NAUSEA: 0
CHILLS: 0
NERVOUS/ANXIOUS: 0
FEVER: 0
DIARRHEA: 0
SHORTNESS OF BREATH: 0
COUGH: 0

## 2020-05-11 NOTE — PROGRESS NOTES
" Provider Encounter- Lower Extremity Ulcer      HISTORY OF PRESENT ILLNESS  Wound History:               START OF CARE IN CLINIC: 04/14/2020               REFERRING PROVIDER: Jose OLGUIN                 WOUND ETIOLOGY: venous              LOCATION: R distal LE circumferentially               HISTORY: Pt is a 69 year old lady who lives near [facility], who reports she underwent a R MANSOOR in October 2019, and had severe edema of the RLE post surgery. This did not go away, ans she said she had \"a scan where they put a pressure cuff on my ankle\" (presumably a venous duplex scan) in January 2020, which she says caused her leg to blister and break open. She was treating the wounds at home with adaptic, hydrogel and roll gauze.   Referred to [facility] by her PCP.     Pertinent Medical History: arthritis, HTN               ETIOLOGY HISTORY: Diagnosed no. Vascular Surgeon:  None, scheduled to see Dr. Aurea Hall in clinic on 5/11/2020 previous vascular procedures pt reports having \"vein stripped years ago\". Compression tubigrip. Varicose Veins yes        TOBACCO USE: Cigarettes 1/2 pack day x 50 years, quit Jan 2020    Patient's problem list, allergies, and current medications reviewed and updated in Epic    Interval History:  5/1/2020: Initial provider visit with FRANDY Valentino.  Patient presents today with only a small open wound remaining.  We did discuss the need for lifelong compression in order to prevent recurrence.  She was measured for compression stockings in clinic today, these will be ordered through [facility].  She does present with some increased erythema to her lower leg, for which doxycycline was ordered.  She has a vascular consult appoint with Dr. Hall in the clinic on 5/11.     5/11/2020:  Ms. Mcrae is doing well without complaint.  She relates that her  is helping with her dressing changes in between visits to the clinic.  She previously underwent a right GSV stripping and excision of " varicose veins.  Venous duplex exam was performed and shows not easy options for treatment, possibly sclerosis or laser treatment of perforators.       REVIEW OF SYSTEMS:   Review of Systems   Constitutional: Negative for chills and fever.   Respiratory: Negative for cough and shortness of breath.    Cardiovascular: Positive for leg swelling. Negative for chest pain and claudication.        Problems with legs swelling for many years   Gastrointestinal: Negative for constipation, diarrhea, nausea and vomiting.   Genitourinary: Negative for dysuria.   Musculoskeletal: Positive for joint pain.   Skin: Negative for rash.   Psychiatric/Behavioral: Negative for depression. The patient is not nervous/anxious.          PHYSICAL EXAMINATION:   /67   Pulse 62   Temp 36.8 °C (98.2 °F)   Resp 16   SpO2 92%     Physical Exam   Constitutional: She is oriented to person, place, and time and well-developed, well-nourished, and in no distress.   Obese   HENT:   Head: Normocephalic.   Eyes: Pupils are equal, round, and reactive to light.   Cardiovascular: Intact distal pulses.   Pulmonary/Chest: Effort normal.   Musculoskeletal:         General: Edema present.      Comments: Nonpitting edema bilateral lower extremities   Neurological: She is alert and oriented to person, place, and time.   Skin: Skin is warm.   Shallow open wound to right lateral lower extremity, now more of an excoriation.  Hemosiderin staining to gaiter area of both lower legs  Further wound flowsheet photos   Psychiatric: Mood and affect normal.       WOUND ASSESSMENT                                                             Wound 04/24/20 Venous Ulcer Leg Lateral Right -Right Lateral LE (Active)   Wound Image   5/11/2020  3:38 PM   Site Assessment [place] 5/11/2020  3:38 PM   Periwound Assessment Edema;Dry;Intact 5/11/2020  3:38 PM   Margins Undefined edges;Other (Comment) 5/11/2020  3:38 PM   Closure Secondary intention 5/11/2020  3:38 PM   Drainage  Amount Moderate 5/11/2020  3:38 PM   Drainage Description Serous 5/11/2020  3:38 PM   Treatments Cleansed 5/11/2020  3:38 PM   Wound Cleansing Foam Cleanser/Washcloth 5/11/2020  3:38 PM   Periwound Protectant Skin Moisturizer;Moisture Barrier 5/11/2020  3:38 PM   Dressing Cleansing/Solutions Normal Saline 5/11/2020  3:38 PM   Dressing Options Hydrofiber Silver;Absorbent Abdominal Pad;Compression Wrap Two Layer 5/11/2020  3:38 PM   Dressing Changed New 5/11/2020  3:38 PM   Dressing Status Clean;Dry;Intact 5/11/2020  3:38 PM   Dressing Change/Treatment Frequency Weekly, and As Needed 5/11/2020  3:38 PM   Non-staged Wound Description Partial thickness 5/11/2020  3:38 PM   Wound Length (cm) 6 cm 5/11/2020  3:38 PM   Wound Width (cm) 4 cm 5/11/2020  3:38 PM   Wound Depth (cm) 0.1 cm 5/11/2020  3:38 PM   Wound Surface Area (cm^2) 24 cm^2 5/11/2020  3:38 PM   Wound Volume (cm^3) 2.4 cm^3 5/11/2020  3:38 PM   Tunneling (cm) 0 cm 5/11/2020  3:38 PM   Undermining (cm) 0 cm 5/11/2020  3:38 PM   Wound Odor None 5/11/2020  3:38 PM   Exposed Structures None 5/11/2020  3:38 PM       Wound 04/24/20 Venous Ulcer Leg Left -Right Medial LE (Active)   Wound Image   5/11/2020  3:39 PM   Site Assessment Pink;Red 5/11/2020  3:39 PM   Periwound Assessment Dry;Edema 5/11/2020  3:39 PM   Margins Undefined edges 5/11/2020  3:39 PM   Closure Secondary intention 5/11/2020  3:39 PM   Drainage Amount Small 5/11/2020  3:39 PM   Drainage Description Serosanguineous 5/11/2020  3:39 PM   Treatments Cleansed 5/11/2020  3:39 PM   Wound Cleansing Foam Cleanser/Washcloth 5/11/2020  3:39 PM   Periwound Protectant Barrier Paste;Skin Moisturizer 5/11/2020  3:39 PM   Dressing Options Viscopaste;Compression Wrap Two Layer 5/11/2020  3:39 PM   Dressing Changed New 5/11/2020  3:39 PM   Dressing Status Clean;Dry;Intact 5/11/2020  3:39 PM   Dressing Change/Treatment Frequency Weekly, and As Needed 5/11/2020  3:39 PM   Non-staged Wound Description Partial  thickness 5/11/2020  3:39 PM   Wound Length (cm) 3.1 cm 5/11/2020  3:39 PM   Wound Width (cm) 2 cm 5/11/2020  3:39 PM   Wound Depth (cm) 0 cm 5/11/2020  3:39 PM   Wound Surface Area (cm^2) 6.2 cm^2 5/11/2020  3:39 PM   Wound Volume (cm^3) 0 cm^3 5/11/2020  3:39 PM   Tunneling (cm) 0 cm 5/11/2020  3:39 PM   Undermining (cm) 0 cm 5/11/2020  3:39 PM   Wound Odor None 5/11/2020  3:39 PM   Exposed Structures None 5/11/2020  3:39 PM       PROCEDURE:   - no need for debridement today  -Wound RN to measure patient for compression stockings, place order through [facility]  -Wound care completed by wound RN, refer to wound flowsheet   -Patient tolerated the procedure well, without c/o of significant pain or discomfort.       Pertinent Labs and Diagnostics:    Labs: N/A    IMAGING: None/NA    VASCULAR STUDIES: None found in epic    LAST  WOUND CULTURE: None found in epic      ASSESSMENT AND PLAN:     1. Open wound of right lower leg, subsequent encounter  Comments: Episode of right lower extremity edema with blistering in October 2019.  Blisters were ruptured with vascular study, resulting in open wounds which did not heal.    5/11/2020: Patient's wounds are essentially resolved, no need for debridement  -Non-invasive studies show no evidence of easy treatment and with rapid healing, if we can keep the right leg ulcer free without intervention, I think this is the safest course.    Wound care: Two layer wrap, remove in one week to shower.  Cancel two week appointment if wounds really resolved.    2. Cellulitis of right lower extremity    5/11/2020: Resolved.  Denies fevers, chills, nausea, vomiting.    3. Class 2 obesity due to excess calories with body mass index (BMI) of 35.0 to 35.9 in adult, unspecified whether serious comorbidity present  Comments: Complicating factor, increased risk for CVI            PATIENT EDUCATION  - Etiology of  venous stasis ulceration discussed with patient  - Importance of managing edema for healing  of ulcer, and for prevention of new ulcer development  -Need for lifelong compression of lower legs   -Elevate legs above the level of the heart periodically throughout the day.      15 min spent face to face with patient, >50% of time spent counseling, coordinating care, reviewing records, discussing POC, educating patient regarding vascular disease, wound healing and progression. This time was spent in excess to procedure time.       Please note that this note may have been created using voice recognition software. I have worked with technical experts from [employer] to optimize the interface.  I have made every reasonable attempt to correct obvious errors, but there may be errors of grammar and possibly     N

## 2020-05-11 NOTE — PATIENT INSTRUCTIONS
-Keep your wound dressing clean, dry, and intact.    -Change your dressing if it becomes soiled, soaked, or falls off.    -Remove your compression wrap if you have severe pain, severe swelling, numbness, color change, or temperature change in your toes. If you need to remove your compression wrap, do so by unrolling it. Do not cut the compression wrap off to prevent cutting yourself on accident.    -Should you experience any significant changes in your wound(s), such as infection (redness, swelling, localized heat, increased pain, fever > 101 F, chills) or have any questions regarding your home care instructions, please contact the wound center at [phone]. If after hours, contact your primary care physician or go to the hospital emergency room.

## 2020-05-15 ENCOUNTER — APPOINTMENT (OUTPATIENT)
Dept: WOUND CARE | Facility: MEDICAL CENTER | Age: 70
End: 2020-05-15
Attending: NURSE PRACTITIONER
Payer: COMMERCIAL

## 2020-05-22 ENCOUNTER — APPOINTMENT (OUTPATIENT)
Dept: WOUND CARE | Facility: MEDICAL CENTER | Age: 70
End: 2020-05-22
Attending: NURSE PRACTITIONER
Payer: COMMERCIAL

## 2024-06-11 NOTE — PROGRESS NOTES
2 RN skin assessment done; skin not WDL. Surgical incision to left groin. All other skin WDL. See Wound flowsheet.   Patient called for refills of Adderall XR 25mg, #60 per 30 days, to be sent to the [facility] pharmacy on file.    This RN ran OARRs with no issues of concern; patient is consistent with refills.

## (undated) DEVICE — MASK ANESTHESIA ADULT  - (100/CA)

## (undated) DEVICE — HEAD HOLDER JUNIOR/ADULT

## (undated) DEVICE — CANISTER SUCTION RIGID RED 1500CC (40EA/CA)

## (undated) DEVICE — DRAPE LAPAROTOMY T SHEET - (12EA/CA)

## (undated) DEVICE — GLOVE BIOGEL SZ 6.5 SURGICAL PF LTX (50PR/BX 4BX/CA)

## (undated) DEVICE — SODIUM CHL. IRRIGATION 0.9% 3000ML (4EA/CA 65CA/PF)

## (undated) DEVICE — GOWN WARMING STANDARD FLEX - (30/CA)

## (undated) DEVICE — GLOVE, LITE (PAIR)

## (undated) DEVICE — SUTURE 2-0 PROLENE SH D/A (36PK/BX)

## (undated) DEVICE — BLADE SURGICAL CLIPPER - (50EA/CA)

## (undated) DEVICE — MANIFOLD NEPTUNE 1 PORT (20/PK)

## (undated) DEVICE — KIT ANESTHESIA W/CIRCUIT & 3/LT BAG W/FILTER (20EA/CA)

## (undated) DEVICE — BLADE SURGICAL #11 - (50/BX)

## (undated) DEVICE — TIP INTPLS HFLO ML ORFC BTRY - (12/CS)  FOR SURGILAV

## (undated) DEVICE — DRAIN J-VAC 7MM FLAT - (10EA/CA)

## (undated) DEVICE — SPONGE GAUZESTER. 2X2 4-PL - (2/PK 50PK/BX 30BX/CS)

## (undated) DEVICE — DRESSING TRANSPARENT FILM TEGADERM 4 X 4.75" (50EA/BX)"

## (undated) DEVICE — SPONGE GAUZE STER 4X4 8-PL - (2/PK 50PK/BX 12BX/CS)

## (undated) DEVICE — CLOSURE SKIN STRIP 1/2 X 4 IN - (STERI STRIP) (50/BX 4BX/CA)

## (undated) DEVICE — NEEDLE NON SAFETY HYPO 22 GA X 1 1/2 IN (100/BX)

## (undated) DEVICE — GOWN SURGEONS X-LARGE - DISP. (30/CA)

## (undated) DEVICE — GLOVE BIOGEL SZ 6 PF LATEX - (50EA/BX 4BX/CA)

## (undated) DEVICE — SWAB ANAEROBIC SPEC.COLLECTOR - (25/PK 4PK/CA 100EA/CA)

## (undated) DEVICE — SUTURE GENERAL

## (undated) DEVICE — DRAIN PENROSE STERILE 1/4 X - 18 IN  (25EA/BX)

## (undated) DEVICE — MASK AIRWAY SIZE 4 UNIQUE SILICON (10EA/BX)

## (undated) DEVICE — ELECTRODE DUAL RETURN W/ CORD - (50/PK)

## (undated) DEVICE — BLADE SURGICAL #15 - (50/BX 3BX/CA)

## (undated) DEVICE — SET LEADWIRE 5 LEAD BEDSIDE DISPOSABLE ECG (1SET OF 5/EA)

## (undated) DEVICE — LEAD SET 6 DISP. EKG NIHON KOHDEN (100EA/CA)

## (undated) DEVICE — SODIUM CHL IRRIGATION 0.9% 1000ML (12EA/CA)

## (undated) DEVICE — TUBE, CULTURE AEROBIC

## (undated) DEVICE — SENSOR SPO2 NEO LNCS ADHESIVE (20/BX) SEE USER NOTES

## (undated) DEVICE — SUTURE 3-0 ETHILON FS-1 - (36/BX) 30 INCH

## (undated) DEVICE — HANDPIECE 10FT INTPLS SCT PLS IRRIGATION HAND CONTROL SET (6/PK)

## (undated) DEVICE — TOWELS CLOTH SURGICAL - (4/PK 20PK/CA)

## (undated) DEVICE — PACK MINOR BASIN - (2EA/CA)

## (undated) DEVICE — TRAY SRGPRP PVP IOD WT PRP - (20/CA)

## (undated) DEVICE — PAD LAP STERILE 18 X 18 - (5/PK 40PK/CA)

## (undated) DEVICE — GLOVE BIOGEL PI INDICATOR SZ 7.5 SURGICAL PF LF -(50/BX 4BX/CA)

## (undated) DEVICE — ELECTRODE 850 FOAM ADHESIVE - HYDROGEL RADIOTRNSPRNT (50/PK)

## (undated) DEVICE — SUTURE 3-0 VICRYL PLUS SH - 8X 18 INCH (12/BX)

## (undated) DEVICE — GLOVE BIOGEL PI INDICATOR SZ 7.0 SURGICAL PF LF - (50/BX 4BX/CA)

## (undated) DEVICE — CATHETER IV 20 GA X 1-1/4 ---SURG.& SDS ONLY--- (50EA/BX)

## (undated) DEVICE — LACTATED RINGERS INJ 1000 ML - (14EA/CA 60CA/PF)

## (undated) DEVICE — CONTAINER SPECIMEN BAG OR - STERILE 4 OZ W/LID (100EA/CA)

## (undated) DEVICE — SPONGE DRAIN 4 X 4IN 6-PLY - (2/PK25PK/BX12BX/CS)

## (undated) DEVICE — GOWN SURGICAL XX-LARGE - (28EA/CA) SIRUS NON REINFORCED

## (undated) DEVICE — GLOVE BIOGEL INDICATOR SZ 6.5 SURGICAL PF LTX - (50PR/BX 4BX/CA)

## (undated) DEVICE — TUBE CONNECTING SUCTION - CLEAR PLASTIC STERILE 72 IN (50EA/CA)

## (undated) DEVICE — GLOVE BIOGEL PI INDICATOR SZ 8.0 SURGICAL PF LF -(50/BX 4BX/CA)

## (undated) DEVICE — NEPTUNE 4 PORT MANIFOLD - (20/PK)

## (undated) DEVICE — PROTECTOR ULNA NERVE - (36PR/CA)

## (undated) DEVICE — CANISTER SUCTION 3000ML MECHANICAL FILTER AUTO SHUTOFF MEDI-VAC NONSTERILE LF DISP  (40EA/CA)

## (undated) DEVICE — SUTURE 4-0 MONOCRYL PLUS PS-2 - 27 INCH (36/BX)

## (undated) DEVICE — SUCTION INSTRUMENT YANKAUER BULBOUS TIP W/O VENT (50EA/CA)

## (undated) DEVICE — BOVIE BLADE COATED &INSULATED - 25/PK

## (undated) DEVICE — KIT  I.V. START (100EA/CA)

## (undated) DEVICE — SET EXTENSION WITH 2 PORTS (48EA/CA) ***PART #2C8610 IS A SUBSTITUTE*****

## (undated) DEVICE — RESERVOIR SUCTION 100 CC - SILICONE (20EA/CA)

## (undated) DEVICE — TUBING CLEARLINK DUO-VENT - C-FLO (48EA/CA)

## (undated) DEVICE — GLOVE SZ 6.5 BIOGEL PI MICRO - PF LF (50PR/BX)

## (undated) DEVICE — SUTURE 3-0 VICRYL PLUS SH - 27 INCH (36/BX)

## (undated) DEVICE — DRAPE LARGE 3 QUARTER - (20/CA)

## (undated) DEVICE — KIT ROOM DECONTAMINATION

## (undated) DEVICE — GLOVE SZ 7.5 LF PROTEXIS (50PR/BX)

## (undated) DEVICE — SYRINGE 10 ML CONTROL LL (25EA/BX 4BX/CA)

## (undated) DEVICE — TRAY SKIN SCRUB PVP WET (20EA/CA) PART #DYND70356 DISCONTINUED

## (undated) DEVICE — GLOVE BIOGEL SZ 8 SURGICAL PF LTX - (50PR/BX 4BX/CA)

## (undated) DEVICE — TUBE E-T HI-LO CUFF 6.5MM (10EA/BX)